# Patient Record
Sex: FEMALE | Race: WHITE | HISPANIC OR LATINO | Employment: UNEMPLOYED | URBAN - METROPOLITAN AREA
[De-identification: names, ages, dates, MRNs, and addresses within clinical notes are randomized per-mention and may not be internally consistent; named-entity substitution may affect disease eponyms.]

---

## 2019-04-05 ENCOUNTER — HOSPITAL ENCOUNTER (EMERGENCY)
Facility: HOSPITAL | Age: 24
Discharge: HOME | End: 2019-04-06
Attending: EMERGENCY MEDICINE

## 2019-04-05 DIAGNOSIS — N30.00 ACUTE CYSTITIS WITHOUT HEMATURIA: Primary | ICD-10-CM

## 2019-04-05 LAB
ALBUMIN SERPL BCP-MCNC: 4.6 G/DL (ref 3.5–5.2)
ALP SERPL-CCNC: 98 U/L (ref 55–135)
ALT SERPL W/O P-5'-P-CCNC: 13 U/L (ref 10–44)
ANION GAP SERPL CALC-SCNC: 7 MMOL/L (ref 8–16)
AST SERPL-CCNC: 17 U/L (ref 10–40)
B-HCG UR QL: NEGATIVE
BACTERIA #/AREA URNS HPF: NORMAL /HPF
BASOPHILS # BLD AUTO: 0.06 K/UL (ref 0–0.2)
BASOPHILS NFR BLD: 0.7 % (ref 0–1.9)
BILIRUB SERPL-MCNC: 0.3 MG/DL (ref 0.1–1)
BILIRUB UR QL STRIP: NEGATIVE
BUN SERPL-MCNC: 10 MG/DL (ref 6–20)
CALCIUM SERPL-MCNC: 9.7 MG/DL (ref 8.7–10.5)
CHLORIDE SERPL-SCNC: 104 MMOL/L (ref 95–110)
CLARITY UR: CLEAR
CO2 SERPL-SCNC: 27 MMOL/L (ref 23–29)
COLOR UR: YELLOW
CREAT SERPL-MCNC: 0.7 MG/DL (ref 0.5–1.4)
CTP QC/QA: YES
DIFFERENTIAL METHOD: ABNORMAL
EOSINOPHIL # BLD AUTO: 0.4 K/UL (ref 0–0.5)
EOSINOPHIL NFR BLD: 4.2 % (ref 0–8)
ERYTHROCYTE [DISTWIDTH] IN BLOOD BY AUTOMATED COUNT: 12.5 % (ref 11.5–14.5)
EST. GFR  (AFRICAN AMERICAN): >60 ML/MIN/1.73 M^2
EST. GFR  (NON AFRICAN AMERICAN): >60 ML/MIN/1.73 M^2
GLUCOSE SERPL-MCNC: 104 MG/DL (ref 70–110)
GLUCOSE UR QL STRIP: NEGATIVE
HCT VFR BLD AUTO: 38.5 % (ref 37–48.5)
HGB BLD-MCNC: 13.3 G/DL (ref 12–16)
HGB UR QL STRIP: NEGATIVE
KETONES UR QL STRIP: NEGATIVE
LEUKOCYTE ESTERASE UR QL STRIP: ABNORMAL
LIPASE SERPL-CCNC: 24 U/L (ref 4–60)
LYMPHOCYTES # BLD AUTO: 2.9 K/UL (ref 1–4.8)
LYMPHOCYTES NFR BLD: 35.4 % (ref 18–48)
MCH RBC QN AUTO: 31.4 PG (ref 27–31)
MCHC RBC AUTO-ENTMCNC: 34.5 G/DL (ref 32–36)
MCV RBC AUTO: 91 FL (ref 82–98)
MICROSCOPIC COMMENT: NORMAL
MONOCYTES # BLD AUTO: 0.6 K/UL (ref 0.3–1)
MONOCYTES NFR BLD: 6.9 % (ref 4–15)
NEUTROPHILS # BLD AUTO: 4.3 K/UL (ref 1.8–7.7)
NEUTROPHILS NFR BLD: 52.7 % (ref 38–73)
NITRITE UR QL STRIP: NEGATIVE
PH UR STRIP: 7 [PH] (ref 5–8)
PLATELET # BLD AUTO: 228 K/UL (ref 150–350)
PMV BLD AUTO: 10.4 FL (ref 9.2–12.9)
POTASSIUM SERPL-SCNC: 3.9 MMOL/L (ref 3.5–5.1)
PROT SERPL-MCNC: 8.1 G/DL (ref 6–8.4)
PROT UR QL STRIP: NEGATIVE
RBC # BLD AUTO: 4.24 M/UL (ref 4–5.4)
RBC #/AREA URNS HPF: 2 /HPF (ref 0–4)
SODIUM SERPL-SCNC: 138 MMOL/L (ref 136–145)
SP GR UR STRIP: 1.01 (ref 1–1.03)
SQUAMOUS #/AREA URNS HPF: 3 /HPF
URN SPEC COLLECT METH UR: ABNORMAL
UROBILINOGEN UR STRIP-ACNC: NEGATIVE EU/DL
WBC # BLD AUTO: 8.24 K/UL (ref 3.9–12.7)
WBC #/AREA URNS HPF: 5 /HPF (ref 0–5)

## 2019-04-05 PROCEDURE — 80053 COMPREHEN METABOLIC PANEL: CPT

## 2019-04-05 PROCEDURE — 81025 URINE PREGNANCY TEST: CPT | Performed by: EMERGENCY MEDICINE

## 2019-04-05 PROCEDURE — 63600175 PHARM REV CODE 636 W HCPCS: Performed by: EMERGENCY MEDICINE

## 2019-04-05 PROCEDURE — 96374 THER/PROPH/DIAG INJ IV PUSH: CPT

## 2019-04-05 PROCEDURE — 81000 URINALYSIS NONAUTO W/SCOPE: CPT

## 2019-04-05 PROCEDURE — 83690 ASSAY OF LIPASE: CPT

## 2019-04-05 PROCEDURE — 85025 COMPLETE CBC W/AUTO DIFF WBC: CPT

## 2019-04-05 PROCEDURE — 99285 EMERGENCY DEPT VISIT HI MDM: CPT | Mod: 25

## 2019-04-05 RX ORDER — ONDANSETRON 2 MG/ML
4 INJECTION INTRAMUSCULAR; INTRAVENOUS
Status: COMPLETED | OUTPATIENT
Start: 2019-04-05 | End: 2019-04-05

## 2019-04-05 RX ORDER — KETOROLAC TROMETHAMINE 30 MG/ML
15 INJECTION, SOLUTION INTRAMUSCULAR; INTRAVENOUS
Status: COMPLETED | OUTPATIENT
Start: 2019-04-05 | End: 2019-04-06

## 2019-04-05 RX ADMIN — IOHEXOL 100 ML: 350 INJECTION, SOLUTION INTRAVENOUS at 11:04

## 2019-04-05 RX ADMIN — ONDANSETRON 4 MG: 2 INJECTION INTRAMUSCULAR; INTRAVENOUS at 09:04

## 2019-04-06 VITALS
TEMPERATURE: 99 F | WEIGHT: 104 LBS | DIASTOLIC BLOOD PRESSURE: 61 MMHG | HEART RATE: 65 BPM | OXYGEN SATURATION: 100 % | SYSTOLIC BLOOD PRESSURE: 119 MMHG | RESPIRATION RATE: 16 BRPM

## 2019-04-06 PROCEDURE — 63600175 PHARM REV CODE 636 W HCPCS: Performed by: EMERGENCY MEDICINE

## 2019-04-06 PROCEDURE — 96375 TX/PRO/DX INJ NEW DRUG ADDON: CPT

## 2019-04-06 PROCEDURE — 25500020 PHARM REV CODE 255: Performed by: EMERGENCY MEDICINE

## 2019-04-06 RX ORDER — CEPHALEXIN 500 MG/1
500 CAPSULE ORAL EVERY 8 HOURS
Qty: 21 CAPSULE | Refills: 0 | Status: SHIPPED | OUTPATIENT
Start: 2019-04-06 | End: 2019-04-13

## 2019-04-06 RX ADMIN — KETOROLAC TROMETHAMINE 15 MG: 30 INJECTION, SOLUTION INTRAMUSCULAR at 12:04

## 2019-04-06 RX ADMIN — CEFTRIAXONE 1 G: 1 INJECTION, SOLUTION INTRAVENOUS at 01:04

## 2019-04-06 NOTE — ED TRIAGE NOTES
Patient presents to the ED with complaints of bilateral pelvic pain that has been intermittent for a couple of weeks. Tender to touch.  Patient reports one episode of emesis today with nausea. Denies pain with urination. Reports small amount of blood in urine. Denies past history of kidney stones or UTIs. Patient has not taken anything for pain. Pain rated 10/10.     Review of patient's allergies indicates:  No Known Allergies     Patient has verified the spelling of their name and  on armband.   APPEARANCE: Patient is alert, calm, oriented x 4, and does not appear distressed.  SKIN: Skin is normal for race, warm, and dry. Normal skin turgor and mucous membranes moist.  CARDIAC: Normal rate and rhythm, no murmur heard.   RESPIRATORY:Normal rate and effort. Breath sounds clear bilaterally throughout chest. Respirations are equal and unlabored.    GASTRO: Bowel sounds normal, abdomen is soft, no tenderness, and no abdominal distention. +n/V  GENITOURINARY: +bilateral pelvic pain. Tenderness to touch. Pain is 10/10 +small amount  Of discharge

## 2019-04-06 NOTE — ED PROVIDER NOTES
Encounter Date: 4/5/2019    SCRIBE #1 NOTE: I, Michelle Ahumada, am scribing for, and in the presence of,  Dr. Jones. I have scribed the entire note.     I, Dr. Juana Jones MD, personally performed the services described in this documentation. All medical record entries made by the scribe were at my direction and in my presence.  I have reviewed the chart and agree that the record reflects my personal performance and is accurate and complete. Juana Jones MD.    History     Chief Complaint   Patient presents with    Abdominal Pain     PT C/O BILATERAL LOWER ABDOMINAL PAIN X2 WEEKS WITH INTERMITTENT NAUSEA. DENIES V/D.     CHIEF COMPLAINT: Patient presents with: Abdominal Pain      HISTORY OF PRESENT ILLNESS: Jessie Sanchez who is a 23 y.o. presents to the emergency department today with complaint of intermittent abdominal pain for the past 2 weeks but is worse today. Patient feels the pain equally on both sides of her abdomen. She reports vaginal discharge x 1 day and slight vaginal bleeding a few days ago. She reports to have nausea and vomiting today but does not report how many episodes of vomiting she had.  She says to have eaten normally today but only a little bit yesterday due to her pain. Patient denies any fever, diarrhea, or any other concerning symptoms. Patient's last known menstrual cycle was 03/28/19.      ALLERGIES REVIEWED  MEDICATIONS REVIEWED  PMH/PSH/SOC/FH REVIEWED     The history is provided by the patient.    Nursing/Ancillary staff note reviewed.      The history is provided by the patient. The history is limited by a language barrier. A  was used.     Review of patient's allergies indicates:  No Known Allergies  History reviewed. No pertinent past medical history.  History reviewed. No pertinent surgical history.  No family history on file.  Social History     Tobacco Use    Smoking status: Not on file   Substance Use Topics    Alcohol use: Not on file     Drug use: Not on file     Review of Systems   Constitutional: Negative for activity change, appetite change, chills, diaphoresis and fever.   HENT: Negative for congestion, drooling, ear pain, mouth sores, rhinorrhea, sinus pain, sore throat and trouble swallowing.    Eyes: Negative for pain and discharge.   Respiratory: Negative for cough, chest tightness, shortness of breath, wheezing and stridor.    Cardiovascular: Negative for chest pain, palpitations and leg swelling.   Gastrointestinal: Positive for abdominal pain, nausea and vomiting. Negative for abdominal distention, blood in stool, constipation and diarrhea.   Genitourinary: Positive for vaginal bleeding and vaginal discharge. Negative for difficulty urinating, dysuria, flank pain, frequency, hematuria and urgency.   Musculoskeletal: Negative for arthralgias, back pain and myalgias.   Skin: Negative for pallor, rash and wound.   Neurological: Negative for dizziness, syncope, weakness, light-headedness and numbness.   All other systems reviewed and are negative.      Physical Exam     Initial Vitals [04/05/19 2025]   BP Pulse Resp Temp SpO2   (!) 146/80 107 18 98.5 °F (36.9 °C) 100 %      MAP       --         Physical Exam    Nursing note and vitals reviewed.  Constitutional: She appears well-developed and well-nourished.   HENT:   Head: Normocephalic and atraumatic.   Right Ear: External ear normal.   Left Ear: External ear normal.   Nose: Nose normal.   Mouth/Throat: Oropharynx is clear and moist.   Eyes: Conjunctivae and EOM are normal. Pupils are equal, round, and reactive to light. No scleral icterus.   Neck: Normal range of motion. Neck supple. No JVD present.   Cardiovascular: Normal rate, regular rhythm, normal heart sounds and intact distal pulses. Exam reveals no gallop and no friction rub.    No murmur heard.  Pulmonary/Chest: Breath sounds normal. No stridor. No respiratory distress. She has no wheezes. She exhibits no tenderness.    Abdominal: Soft. Bowel sounds are normal. She exhibits no distension and no mass. There is tenderness. There is no rebound and no guarding.   Tenderness along lower abdomen   Musculoskeletal: Normal range of motion. She exhibits no edema or tenderness.   Back is nontender to palpation.    Neurological: She is alert and oriented to person, place, and time. She has normal strength. No cranial nerve deficit.   Skin: Skin is warm and dry. Capillary refill takes less than 2 seconds. No rash noted. No pallor.   Psychiatric: She has a normal mood and affect. Thought content normal.         ED Course   Procedures  Labs Reviewed   CBC W/ AUTO DIFFERENTIAL - Abnormal; Notable for the following components:       Result Value    MCH 31.4 (*)     All other components within normal limits   COMPREHENSIVE METABOLIC PANEL - Abnormal; Notable for the following components:    Anion Gap 7 (*)     All other components within normal limits   URINALYSIS, REFLEX TO URINE CULTURE - Abnormal; Notable for the following components:    Leukocytes, UA 2+ (*)     All other components within normal limits    Narrative:     Preferred Collection Type->Urine, Clean Catch   LIPASE   URINALYSIS MICROSCOPIC    Narrative:     Preferred Collection Type->Urine, Clean Catch   POCT URINE PREGNANCY          X-Rays:   Independently Interpreted Readings:   Other Readings:  Reviewed by myself, read by radiology.    Imaging Results          CT Abdomen Pelvis With Contrast (Final result)  Result time 04/06/19 00:50:09    Final result by Seferino Nunn MD (04/06/19 00:50:09)                 Impression:      1.  No CT evidence of acute intra-abdominal abnormality, noting unremarkable appearance of the appendix.    2.  Small corpus luteum cyst within the right adnexa.      Electronically signed by: Seferino Nunn MD  Date:    04/06/2019  Time:    00:50             Narrative:    EXAMINATION:  CT ABDOMEN PELVIS WITH CONTRAST    CLINICAL HISTORY:  RLQ pain,  appendicitis suspected;    TECHNIQUE:  Low dose axial images, sagittal and coronal reformations were obtained from the lung bases to the pubic symphysis following the IV administration of 75 mL of Omnipaque 350 .  Oral contrast was not given.    COMPARISON:  None.    FINDINGS:  The lung bases are unremarkable.  There is no pleural fluid present.  The visualized portions of the heart appear normal.    The liver is normal in size and attenuation with no focal hepatic abnormality.  The gallbladder shows no evidence of stones or pericholecystic fluid.  There is no intra-or extrahepatic biliary ductal dilatation.    The stomach, spleen, pancreas, and adrenal glands are unremarkable.    The kidneys enhance symmetrically and demonstrate no evidence of hydronephrosis.  The urinary bladder is unremarkable. The uterus appears within normal limits.  There is a probable small 1.5 cm corpus luteum cyst within the right adnexa.  There is no significant free fluid within the pelvis.    The abdominal aorta is normal in course and caliber without significant atherosclerotic calcifications.    The visualized loops of small and large bowel show no evidence of obstruction or inflammation.  The appendix appears within normal limits.  There is no ascites or free intraperitoneal air.    When viewed with bone windows the osseous structures are unremarkable.  The extraperitoneal soft tissues are unremarkable.                              Medical Decision Making:   Initial Assessment:   Jessie Sanchez presents to the emergency department today complaining of abdominal pain.  Will go ahead and obtain labs and given the extent of pain obtain imaging.  Treat symptoms and reassess.  Differential Diagnosis:   Gastroenteritis, gastritis, ulcer, cholecystitis, gallstones, pancreatitis, ileus, small bowel obstruction, appendicitis, constipation.   Clinical Tests:   Lab Tests: Ordered and Reviewed  Radiological Study: Ordered and  Reviewed  ED Management:  The pt presents to the ED today with lower abdominal pain, her workup today shows UA. I did get a CT scan in order to further evaluate her pain, no need for admission. I will discharge her home on ABX. Her pain is improved following meds here and she feels improved. After taking into careful account the historical factors and physical exam findings of the patient's presentation today, in conjunction with the empirical and objective data obtained on ED workup, no acute emergent medical condition requiring admission has been identified. The patient appears to be low risk for an emergent medical condition and I feel it is safe and appropriate at this time for the patient to be discharged to follow-up as detailed in their discharge instructions for reevaluation and possible continued outpatient workup and management. I have discussed the specifics of the workup with the patient and the patient has verbalized understanding of the details of the workup, the diagnosis, the treatment plan, and the need for outpatient follow-up.  Although the patient has no emergent etiology today this does not preclude the development of an emergent condition so in addition, I have advised the patient that they can return to the ED and/or activate EMS at any time with worsening of their symptoms, change of their symptoms, or with any other medical complaint.  The patient remained comfortable and stable during their visit in the ED.  Discharge and follow-up instructions discussed with the patient who expressed understanding and willingness to comply with my recommendations.                      Clinical Impression:     1. Acute cystitis without hematuria        Disposition:   Disposition: Discharged  Condition: Stable                        Juana Jones MD  04/09/19 0208

## 2019-04-06 NOTE — DISCHARGE INSTRUCTIONS
Additional instructions  Followup with your primary care physician in 5-7 days if you are not improving. Take all your medications as prescribed. Return to the emergency department if you have increasing pain, chest pain, difficulty breathing,  nonstop vomiting, or any other concerns. Be sure to drink plenty of fluids to stay hydrated. Get plenty of rest. Please refer to additional educational material for further instructions.

## 2019-07-12 ENCOUNTER — TELEPHONE (OUTPATIENT)
Dept: OBSTETRICS AND GYNECOLOGY | Facility: CLINIC | Age: 24
End: 2019-07-12

## 2019-07-12 NOTE — TELEPHONE ENCOUNTER
----- Message from Boogie Menchaca sent at 7/11/2019  3:37 PM CDT -----  Contact: Referral  NP to dept positive pregnancy test referred by Etienne Uribe NP referral scanned into  pt primary language is Mongolian will need an  [phone]) pt can be reached a [phone]

## 2019-07-12 NOTE — TELEPHONE ENCOUNTER
Spoke to patient and she wants to schedule in [place]. Gave her the phone number [phone]. Patient says she has medicaid and it has been approved. Let her know to have this information ready when she calls the number to schedule her appointment. Patient verbalized understanding.

## 2019-07-24 ENCOUNTER — TELEPHONE (OUTPATIENT)
Dept: OBSTETRICS AND GYNECOLOGY | Facility: CLINIC | Age: 24
End: 2019-07-24

## 2019-07-24 NOTE — TELEPHONE ENCOUNTER
Returned call via language line, pt scheduled for 7/30 at 1pm. Provided address/nidhi, Patient verbalized understanding.

## 2019-07-24 NOTE — TELEPHONE ENCOUNTER
----- Message from Boogie Menchaca sent at 7/24/2019 12:55 PM CDT -----  Contact: referral   NP to dept positive pregnancy test referred by Etienne Uribe NP referral scanned into  pt primary language is South African will need an  [phone]) pt can be reached a [phone]

## 2019-07-30 ENCOUNTER — OFFICE VISIT (OUTPATIENT)
Dept: OBSTETRICS AND GYNECOLOGY | Facility: CLINIC | Age: 24
End: 2019-07-30
Payer: MEDICAID

## 2019-07-30 ENCOUNTER — TELEPHONE (OUTPATIENT)
Dept: OBSTETRICS AND GYNECOLOGY | Facility: CLINIC | Age: 24
End: 2019-07-30

## 2019-07-30 VITALS
SYSTOLIC BLOOD PRESSURE: 108 MMHG | WEIGHT: 103.19 LBS | BODY MASS INDEX: 20.8 KG/M2 | DIASTOLIC BLOOD PRESSURE: 64 MMHG | HEIGHT: 59 IN

## 2019-07-30 DIAGNOSIS — N91.2 ABSENT MENSES: ICD-10-CM

## 2019-07-30 DIAGNOSIS — Z32.01 POSITIVE URINE PREGNANCY TEST: Primary | ICD-10-CM

## 2019-07-30 LAB
B-HCG UR QL: POSITIVE
CTP QC/QA: YES

## 2019-07-30 PROCEDURE — 87481 CANDIDA DNA AMP PROBE: CPT | Mod: 59

## 2019-07-30 PROCEDURE — 88141 CYTOPATH C/V INTERPRET: CPT | Mod: ,,, | Performed by: PATHOLOGY

## 2019-07-30 PROCEDURE — 99999 PR PBB SHADOW E&M-EST. PATIENT-LVL III: CPT | Mod: PBBFAC,,, | Performed by: OBSTETRICS & GYNECOLOGY

## 2019-07-30 PROCEDURE — 88175 CYTOPATH C/V AUTO FLUID REDO: CPT | Performed by: PATHOLOGY

## 2019-07-30 PROCEDURE — 87491 CHLMYD TRACH DNA AMP PROBE: CPT

## 2019-07-30 PROCEDURE — 99203 OFFICE O/P NEW LOW 30 MIN: CPT | Mod: TH,S$PBB,, | Performed by: OBSTETRICS & GYNECOLOGY

## 2019-07-30 PROCEDURE — 81025 URINE PREGNANCY TEST: CPT | Mod: PBBFAC,PO | Performed by: OBSTETRICS & GYNECOLOGY

## 2019-07-30 PROCEDURE — 87086 URINE CULTURE/COLONY COUNT: CPT

## 2019-07-30 PROCEDURE — 99999 PR PBB SHADOW E&M-EST. PATIENT-LVL III: ICD-10-PCS | Mod: PBBFAC,,, | Performed by: OBSTETRICS & GYNECOLOGY

## 2019-07-30 PROCEDURE — 88141 LIQUID-BASED PAP SMEAR, SCREENING: ICD-10-PCS | Mod: ,,, | Performed by: PATHOLOGY

## 2019-07-30 PROCEDURE — 99213 OFFICE O/P EST LOW 20 MIN: CPT | Mod: PBBFAC,TH,PO | Performed by: OBSTETRICS & GYNECOLOGY

## 2019-07-30 PROCEDURE — 87801 DETECT AGNT MULT DNA AMPLI: CPT

## 2019-07-30 PROCEDURE — 99203 PR OFFICE/OUTPT VISIT, NEW, LEVL III, 30-44 MIN: ICD-10-PCS | Mod: TH,S$PBB,, | Performed by: OBSTETRICS & GYNECOLOGY

## 2019-07-30 RX ORDER — PNV NO.95/FERROUS FUM/FOLIC AC 28MG-0.8MG
TABLET ORAL
Refills: 4 | COMMUNITY
Start: 2019-07-13

## 2019-07-30 RX ORDER — ONDANSETRON 4 MG/1
4 TABLET, ORALLY DISINTEGRATING ORAL EVERY 6 HOURS PRN
Qty: 30 TABLET | Refills: 1 | Status: SHIPPED | OUTPATIENT
Start: 2019-07-30 | End: 2019-09-03

## 2019-07-30 NOTE — TELEPHONE ENCOUNTER
Contacted pt via Language line to schedule anatomy sooner than 8/15, opening became available for 8/8 pt rescheduled for 8/8 at 2pm, and to do labs after. Patient verbalized understanding.

## 2019-07-30 NOTE — PROGRESS NOTES
"                                                                                     OBSTETRICS OFFICE NOTE  Reason for visit: Absence of menses    HPI: Pt is a 24 y.o.  female  who presents with complaint of absence of menstruation.  She reports nausea with occasional vomiting. Denies abdominal pain/bleeding.  UPT is positive. LMP:unsure sometime in May . Reports previously regular cycles. Last pap never.    Past Medical History:   Diagnosis Date    Anemia        History reviewed. No pertinent surgical history.    Family History   Problem Relation Age of Onset    Diabetes Father     Hypertension Mother     Breast cancer Neg Hx     Colon cancer Neg Hx     Ovarian cancer Neg Hx        Social History     Tobacco Use    Smoking status: Never Smoker    Smokeless tobacco: Never Used   Substance Use Topics    Alcohol use: Not Currently    Drug use: Never       OB History    Para Term  AB Living   0 0 0 0 0 0   SAB TAB Ectopic Multiple Live Births   0 0 0 0 0       Current Outpatient Medications   Medication Sig    PRENATAL 28 mg iron- 800 mcg Tab TK 1 T PO QD    ondansetron (ZOFRAN-ODT) 4 MG TbDL dissolve 1 tablet (4 mg total) by mouth every 6 (six) hours as needed.     No current facility-administered medications for this visit.        Allergies: Patient has no known allergies.     /64   Ht 4' 11" (1.499 m)   Wt 46.8 kg (103 lb 2.8 oz)   LMP 2019 (Within Weeks)   BMI 20.84 kg/m²     ROS:  GENERAL:Denies fever or chills.   SKIN: Denies rash or lesions.   HEAD: Denies head injury or headache.   CHEST: Denies chest pain or shortness of breath.   CARDIOVASCULAR: Denies palpitations or chest pain.   ABDOMEN: See HPI  URINARY: see HPI.  REPRODUCTIVE: See HPI.   BREASTS: Denies pain, lumps, or nipple discharge.   HEMATOLOGIC: No easy bruisability or excessive bleeding.   MUSCULOSKELETAL: Denies joint pain or swelling.   NEUROLOGIC: Denies syncope or weakness.     Physical " Exam:  GENERAL: alert, appears stated age and cooperative  NEUROLOGIC: orientated to person, place and time, normal mood and affect   CHEST: Normal respiratory effort  NECK: normal appearance  SKIN: no acne, striae, hirsutism  BREAST EXAM: breasts appear normal, no suspicious masses, no skin or nipple changes or axillary nodes  ABDOMEN: abdomen is soft without significant tenderness, masses, organomegaly or guarding, no hernias noted  EXTERNAL GENITALIA:  normal general appearance  URETHRA: normal urethra, normal urethral meatus  VAGINA:  Normal mucosa without tenderness, induration or masses  CERVIX:  Normal  UTERUS:  size consistent with 20 weeks  ADNEXA: nontender normal adnexa in size, nontender and no masses    PROCEDURES:  - UPT: positive  - Dating U/S: to be scheduled     ASSESSMENT and PLAN:    ICD-10-CM ICD-9-CM    1. Positive urine pregnancy test Z32.01 V72.42 Urine culture      POCT urine pregnancy      Type & Screen - Ob Profile      CBC auto differential      RPR      HIV 1/2 Ag/Ab (4th Gen)      Hepatitis B surface antigen      Rubella antibody, IgG      C. trachomatis/N. gonorrhoeae by AMP DNA      Vaginosis Screen by DNA Probe      Liquid-based pap smear, screening      Hemoglobin Electrophoresis,Hgb A2 Aj.      Cystic Fibrosis Mutation Panel      US OB/GYN Procedure (Viewpoint)      CANCELED: US OB/GYN Procedure (Viewpoint)      CANCELED: US OB Less Than 14 Wks First Gestation   2. Absent menses N91.2 626.0        Plan:   1. +UPT, PNL and dating U/S ordered. Bedside U/S confirms around 20weeks today, female, anterior placenta, +FCA at 142bpm    Patient was counseled today on routine 2T precautions, including vaginal bleeding and abdominal pain. Anueploidy screening offered.  Weight: We discussed proper weight gain based on the [employer]'s recommendations based on her pre-pregnancy weight- BMI: 18.5-24.9= 25-35 lbs total and 1 lb/week in 2nd-3rd trimester. Diet: Avoid raw meat ie sushi,  unpasteurized cheese, and heat up deli meat. Eat fish that are high in mercury (nacho mackerel, swordfish, tuna) only 6-12 oz once  a week. Environment: Patient also given environmental precautions such as avoiding cat litter and gardening without gloves. Discussed daily prenatal vitamin with folate/iron options (i.e. stool softener, DHA) and avoidance of smoking. Regular and moderate exercise for 30 min or more per day with the avoidance of activities with a high risk of falling, prolonged supine positions, or abdominal trauma.    Follow up in about 4 weeks (around 8/27/2019) for prenatal visit.    Jessica Morejon MD  OB/GYN  Pager: [phone]

## 2019-07-30 NOTE — LETTER
July 30, 2019      Etienne Uribe, [place]  [address]  [address]  [facility] [address]           [facility]  [address] [facility] [address]  [place] [address]  Phone: [phone]          Patient: Jessie Ayers   MR Number: [id number]   YOB: 1995   Date of Visit: 7/30/2019       Dear Etienne Uribe:    Thank you for referring Jessie Ayers to me for evaluation. Attached you will find relevant portions of my assessment and plan of care.    If you have questions, please do not hesitate to call me. I look forward to following Jessie Ayesr along with you.    Sincerely,    Jessica Morejon MD    Enclosure  CC:  No Recipients    If you would like to receive this communication electronically, please contact [email or web address] or [phone] to request more information on [email or web address] Link access.    For providers and/or their staff who would like to refer a patient to [facility], please contact us through our one-stop-shop provider referral line, [facility], at [phone].    If you feel you have received this communication in error or would no longer like to receive these types of communications, please e-mail [email or web address]

## 2019-08-01 LAB
BACTERIA UR CULT: NO GROWTH
BACTERIAL VAGINOSIS DNA: POSITIVE
C TRACH DNA SPEC QL NAA+PROBE: DETECTED
CANDIDA GLABRATA DNA: NEGATIVE
CANDIDA KRUSEI DNA: NEGATIVE
CANDIDA RRNA VAG QL PROBE: POSITIVE
N GONORRHOEA DNA SPEC QL NAA+PROBE: NOT DETECTED
T VAGINALIS RRNA GENITAL QL PROBE: NEGATIVE

## 2019-08-02 ENCOUNTER — TELEPHONE (OUTPATIENT)
Dept: OBSTETRICS AND GYNECOLOGY | Facility: CLINIC | Age: 24
End: 2019-08-02

## 2019-08-02 RX ORDER — FLUCONAZOLE 150 MG/1
150 TABLET ORAL ONCE
Qty: 2 TABLET | Refills: 0 | Status: SHIPPED | OUTPATIENT
Start: 2019-08-02 | End: 2019-08-06

## 2019-08-02 RX ORDER — AZITHROMYCIN 500 MG/1
1000 TABLET, FILM COATED ORAL ONCE
Qty: 2 TABLET | Refills: 1 | Status: SHIPPED | OUTPATIENT
Start: 2019-08-02 | End: 2019-08-09

## 2019-08-02 RX ORDER — METRONIDAZOLE 500 MG/1
500 TABLET ORAL EVERY 12 HOURS
Qty: 14 TABLET | Refills: 0 | Status: SHIPPED | OUTPATIENT
Start: 2019-08-02 | End: 2019-08-12

## 2019-08-02 NOTE — TELEPHONE ENCOUNTER
Contacted pt via Language line, informed pt of +BV, + Yeast, and + Chlamydia. Informed pt Chlamydia is a sexually transmitted infection, acquired by having sexual intercourse with a partner who also has the infection. Informed pt I can be treated with a medication called Azithromycin, 2 tabs taken at the same time once. Advised pt to pickup refill to give to partner to take as well and avoid sexual contact for 7 days so infection can clear. Patient verbalized understanding. Explained to pt BV is Bacterial Vaginosis which is an overgrowth of bacteria in the vagina, not sexually transmitted, and can be treated with Flagyl taken twice a day for 7 days. Patient verbalized understanding. Also, informed of Diflucan sent to treat the yeast infection. Advised pt to pickup all 3 rx and take as directed.

## 2019-08-02 NOTE — TELEPHONE ENCOUNTER
----- Message from Jessica Morejon MD sent at 8/2/2019  1:12 PM CDT -----  Please inform of yeast, bv and chlamydia. Rx sent

## 2019-08-06 ENCOUNTER — TELEPHONE (OUTPATIENT)
Dept: OBSTETRICS AND GYNECOLOGY | Facility: CLINIC | Age: 24
End: 2019-08-06

## 2019-08-06 NOTE — TELEPHONE ENCOUNTER
----- Message from Ada Leslie sent at 8/6/2019  9:51 AM CDT -----  Contact: Self [phone]  Patient is calling to see if the medications she was prescribed she has to share with her  or medication will be called for him for the infection. Speaks Amharic only

## 2019-08-06 NOTE — TELEPHONE ENCOUNTER
Returned call via Language Line, informed pt Dr. Morejon sent a refill of the Azithromycin to give to partner to treat the Chlamydia. Pt states she thought they had to pay for an additional rx. Informed pt Azithromycin cannot be purchased without a prescription. Advised pt to go back to pharmacy and pickup refill for partner. Patient verbalized understanding.

## 2019-08-08 ENCOUNTER — LAB VISIT (OUTPATIENT)
Dept: LAB | Facility: HOSPITAL | Age: 24
End: 2019-08-08
Attending: OBSTETRICS & GYNECOLOGY
Payer: MEDICAID

## 2019-08-08 ENCOUNTER — PROCEDURE VISIT (OUTPATIENT)
Dept: OBSTETRICS AND GYNECOLOGY | Facility: CLINIC | Age: 24
End: 2019-08-08
Payer: MEDICAID

## 2019-08-08 DIAGNOSIS — Z32.01 POSITIVE URINE PREGNANCY TEST: ICD-10-CM

## 2019-08-08 LAB
ABO + RH BLD: NORMAL
BASOPHILS # BLD AUTO: 0.02 K/UL (ref 0–0.2)
BASOPHILS NFR BLD: 0.2 % (ref 0–1.9)
BLD GP AB SCN CELLS X3 SERPL QL: NORMAL
DIFFERENTIAL METHOD: ABNORMAL
EOSINOPHIL # BLD AUTO: 0.1 K/UL (ref 0–0.5)
EOSINOPHIL NFR BLD: 1.3 % (ref 0–8)
ERYTHROCYTE [DISTWIDTH] IN BLOOD BY AUTOMATED COUNT: 13.5 % (ref 11.5–14.5)
HCT VFR BLD AUTO: 34.2 % (ref 37–48.5)
HGB BLD-MCNC: 11.8 G/DL (ref 12–16)
LYMPHOCYTES # BLD AUTO: 1.6 K/UL (ref 1–4.8)
LYMPHOCYTES NFR BLD: 18.9 % (ref 18–48)
MCH RBC QN AUTO: 32.5 PG (ref 27–31)
MCHC RBC AUTO-ENTMCNC: 34.5 G/DL (ref 32–36)
MCV RBC AUTO: 94 FL (ref 82–98)
MONOCYTES # BLD AUTO: 0.6 K/UL (ref 0.3–1)
MONOCYTES NFR BLD: 7.2 % (ref 4–15)
NEUTROPHILS # BLD AUTO: 6.1 K/UL (ref 1.8–7.7)
NEUTROPHILS NFR BLD: 72.4 % (ref 38–73)
PLATELET # BLD AUTO: 191 K/UL (ref 150–350)
PMV BLD AUTO: 10.5 FL (ref 9.2–12.9)
RBC # BLD AUTO: 3.63 M/UL (ref 4–5.4)
WBC # BLD AUTO: 8.48 K/UL (ref 3.9–12.7)

## 2019-08-08 PROCEDURE — 85025 COMPLETE CBC W/AUTO DIFF WBC: CPT

## 2019-08-08 PROCEDURE — 83020 HEMOGLOBIN ELECTROPHORESIS: CPT

## 2019-08-08 PROCEDURE — 86850 RBC ANTIBODY SCREEN: CPT

## 2019-08-08 PROCEDURE — 87340 HEPATITIS B SURFACE AG IA: CPT

## 2019-08-08 PROCEDURE — 86703 HIV-1/HIV-2 1 RESULT ANTBDY: CPT

## 2019-08-08 PROCEDURE — 86592 SYPHILIS TEST NON-TREP QUAL: CPT

## 2019-08-08 PROCEDURE — 81220 CFTR GENE COM VARIANTS: CPT

## 2019-08-08 PROCEDURE — 99499 NO LOS: ICD-10-PCS | Mod: S$PBB,,, | Performed by: OBSTETRICS & GYNECOLOGY

## 2019-08-08 PROCEDURE — 86762 RUBELLA ANTIBODY: CPT

## 2019-08-08 PROCEDURE — 99499 UNLISTED E&M SERVICE: CPT | Mod: S$PBB,,, | Performed by: OBSTETRICS & GYNECOLOGY

## 2019-08-08 PROCEDURE — 36415 COLL VENOUS BLD VENIPUNCTURE: CPT

## 2019-08-09 LAB
HBV SURFACE AG SERPL QL IA: NEGATIVE
HGB A2 MFR BLD HPLC: 3.1 % (ref 2.2–3.2)
HGB FRACT BLD ELPH-IMP: NORMAL
HGB FRACT BLD ELPH-IMP: NORMAL
HIV 1+2 AB+HIV1 P24 AG SERPL QL IA: NEGATIVE
RPR SER QL: NORMAL
RUBV IGG SER-ACNC: 22.5 IU/ML
RUBV IGG SER-IMP: REACTIVE

## 2019-08-12 LAB — CFTR MUT ANL BLD/T: NORMAL

## 2019-09-03 ENCOUNTER — ROUTINE PRENATAL (OUTPATIENT)
Dept: OBSTETRICS AND GYNECOLOGY | Facility: CLINIC | Age: 24
End: 2019-09-03
Payer: MEDICAID

## 2019-09-03 ENCOUNTER — TELEPHONE (OUTPATIENT)
Dept: OBSTETRICS AND GYNECOLOGY | Facility: CLINIC | Age: 24
End: 2019-09-03

## 2019-09-03 ENCOUNTER — LAB VISIT (OUTPATIENT)
Dept: LAB | Facility: HOSPITAL | Age: 24
End: 2019-09-03
Attending: OBSTETRICS & GYNECOLOGY
Payer: MEDICAID

## 2019-09-03 VITALS
WEIGHT: 107.56 LBS | SYSTOLIC BLOOD PRESSURE: 100 MMHG | BODY MASS INDEX: 21.73 KG/M2 | DIASTOLIC BLOOD PRESSURE: 62 MMHG

## 2019-09-03 DIAGNOSIS — Z11.3 SCREENING FOR STD (SEXUALLY TRANSMITTED DISEASE): ICD-10-CM

## 2019-09-03 DIAGNOSIS — Z12.4 SCREENING FOR CERVICAL CANCER: ICD-10-CM

## 2019-09-03 DIAGNOSIS — Z34.02 SUPERVISION OF NORMAL FIRST PREGNANCY IN SECOND TRIMESTER: ICD-10-CM

## 2019-09-03 DIAGNOSIS — R87.615 UNSATISFACTORY CERVICAL PAPANICOLAOU SMEAR: Primary | ICD-10-CM

## 2019-09-03 DIAGNOSIS — N89.8 VAGINAL DISCHARGE: ICD-10-CM

## 2019-09-03 LAB
BASOPHILS # BLD AUTO: 0.01 K/UL (ref 0–0.2)
BASOPHILS NFR BLD: 0.1 % (ref 0–1.9)
DIFFERENTIAL METHOD: ABNORMAL
EOSINOPHIL # BLD AUTO: 0.1 K/UL (ref 0–0.5)
EOSINOPHIL NFR BLD: 1.6 % (ref 0–8)
ERYTHROCYTE [DISTWIDTH] IN BLOOD BY AUTOMATED COUNT: 13.3 % (ref 11.5–14.5)
GLUCOSE SERPL-MCNC: 177 MG/DL (ref 70–140)
HCT VFR BLD AUTO: 34.9 % (ref 37–48.5)
HGB BLD-MCNC: 11.7 G/DL (ref 12–16)
LYMPHOCYTES # BLD AUTO: 1.3 K/UL (ref 1–4.8)
LYMPHOCYTES NFR BLD: 15.1 % (ref 18–48)
MCH RBC QN AUTO: 32.4 PG (ref 27–31)
MCHC RBC AUTO-ENTMCNC: 33.5 G/DL (ref 32–36)
MCV RBC AUTO: 97 FL (ref 82–98)
MONOCYTES # BLD AUTO: 0.7 K/UL (ref 0.3–1)
MONOCYTES NFR BLD: 8.5 % (ref 4–15)
NEUTROPHILS # BLD AUTO: 6.2 K/UL (ref 1.8–7.7)
NEUTROPHILS NFR BLD: 74.7 % (ref 38–73)
PLATELET # BLD AUTO: 192 K/UL (ref 150–350)
PMV BLD AUTO: 10.3 FL (ref 9.2–12.9)
RBC # BLD AUTO: 3.61 M/UL (ref 4–5.4)
WBC # BLD AUTO: 8.5 K/UL (ref 3.9–12.7)

## 2019-09-03 PROCEDURE — 88141 CYTOPATH C/V INTERPRET: CPT | Mod: ,,, | Performed by: PATHOLOGY

## 2019-09-03 PROCEDURE — 99212 OFFICE O/P EST SF 10 MIN: CPT | Mod: PBBFAC,TH,PO | Performed by: OBSTETRICS & GYNECOLOGY

## 2019-09-03 PROCEDURE — 36415 COLL VENOUS BLD VENIPUNCTURE: CPT

## 2019-09-03 PROCEDURE — 99999 PR PBB SHADOW E&M-EST. PATIENT-LVL II: CPT | Mod: PBBFAC,,, | Performed by: OBSTETRICS & GYNECOLOGY

## 2019-09-03 PROCEDURE — 87481 CANDIDA DNA AMP PROBE: CPT | Mod: 59

## 2019-09-03 PROCEDURE — 87801 DETECT AGNT MULT DNA AMPLI: CPT

## 2019-09-03 PROCEDURE — 99999 PR PBB SHADOW E&M-EST. PATIENT-LVL II: ICD-10-PCS | Mod: PBBFAC,,, | Performed by: OBSTETRICS & GYNECOLOGY

## 2019-09-03 PROCEDURE — 99213 PR OFFICE/OUTPT VISIT, EST, LEVL III, 20-29 MIN: ICD-10-PCS | Mod: TH,S$PBB,, | Performed by: OBSTETRICS & GYNECOLOGY

## 2019-09-03 PROCEDURE — 88141 LIQUID-BASED PAP SMEAR, SCREENING: ICD-10-PCS | Mod: ,,, | Performed by: PATHOLOGY

## 2019-09-03 PROCEDURE — 87491 CHLMYD TRACH DNA AMP PROBE: CPT

## 2019-09-03 PROCEDURE — 85025 COMPLETE CBC W/AUTO DIFF WBC: CPT

## 2019-09-03 PROCEDURE — 82950 GLUCOSE TEST: CPT

## 2019-09-03 PROCEDURE — 87661 TRICHOMONAS VAGINALIS AMPLIF: CPT

## 2019-09-03 PROCEDURE — 88175 CYTOPATH C/V AUTO FLUID REDO: CPT | Performed by: PATHOLOGY

## 2019-09-03 PROCEDURE — 99213 OFFICE O/P EST LOW 20 MIN: CPT | Mod: TH,S$PBB,, | Performed by: OBSTETRICS & GYNECOLOGY

## 2019-09-03 NOTE — TELEPHONE ENCOUNTER
Returned call via Language Line, pt states she would like like to reschedule her appt on 9/17 to today or tomorrow. Pt states she doesn't have anything going on she just was suppose to be seen by 8/27 but missed the appointment and was told to call to see if she can be seen sooner than next available. Pt rescheduled for 9/3 at 10:30am. Patient verbalized understanding.

## 2019-09-03 NOTE — PROGRESS NOTES
Good fm.  Denies ctx, vb, lof. Retest GC/Ct and pap. RTC in 4 weeks. Ob glucose screen and cbc ordered. Consents signed for delivery today with

## 2019-09-03 NOTE — TELEPHONE ENCOUNTER
----- Message from Neena Diamond sent at 9/3/2019  8:29 AM CDT -----  Contact: [phone]/self  Patient requesting to speak with you regarding rescheduling her appointment on 9/17/19 for today. Please advise.

## 2019-09-04 ENCOUNTER — TELEPHONE (OUTPATIENT)
Dept: OBSTETRICS AND GYNECOLOGY | Facility: CLINIC | Age: 24
End: 2019-09-04

## 2019-09-04 DIAGNOSIS — O99.810 ABNORMAL GLUCOSE TOLERANCE AFFECTING PREGNANCY, ANTEPARTUM: Primary | ICD-10-CM

## 2019-09-04 LAB
C TRACH DNA SPEC QL NAA+PROBE: NOT DETECTED
N GONORRHOEA DNA SPEC QL NAA+PROBE: NOT DETECTED

## 2019-09-04 NOTE — TELEPHONE ENCOUNTER
Called and informed patient of results, patient will call us back to schedule the 3hrs glucose test. Patient verbalized understanding.     Notes recorded by Jessica Morejon MD on 9/4/2019 at 6:55 AM CDT  Pt needs 3hr gtt. Orders placed. Please inform

## 2019-09-05 ENCOUNTER — TELEPHONE (OUTPATIENT)
Dept: OBSTETRICS AND GYNECOLOGY | Facility: CLINIC | Age: 24
End: 2019-09-05

## 2019-09-05 LAB
BACTERIAL VAGINOSIS DNA: NEGATIVE
CANDIDA GLABRATA DNA: NEGATIVE
CANDIDA KRUSEI DNA: NEGATIVE
CANDIDA RRNA VAG QL PROBE: NEGATIVE
T VAGINALIS RRNA GENITAL QL PROBE: NEGATIVE

## 2019-09-12 ENCOUNTER — LAB VISIT (OUTPATIENT)
Dept: LAB | Facility: HOSPITAL | Age: 24
End: 2019-09-12
Attending: OBSTETRICS & GYNECOLOGY
Payer: MEDICAID

## 2019-09-12 DIAGNOSIS — O99.810 ABNORMAL GLUCOSE TOLERANCE AFFECTING PREGNANCY, ANTEPARTUM: ICD-10-CM

## 2019-09-12 LAB
GLUCOSE SERPL-MCNC: 106 MG/DL
GLUCOSE SERPL-MCNC: 173 MG/DL
GLUCOSE SERPL-MCNC: 75 MG/DL (ref 70–110)
GLUCOSE SERPL-MCNC: 91 MG/DL

## 2019-09-12 PROCEDURE — 36415 COLL VENOUS BLD VENIPUNCTURE: CPT

## 2019-09-12 PROCEDURE — 82951 GLUCOSE TOLERANCE TEST (GTT): CPT

## 2019-09-12 PROCEDURE — 82952 GTT-ADDED SAMPLES: CPT

## 2019-09-17 ENCOUNTER — TELEPHONE (OUTPATIENT)
Dept: OBSTETRICS AND GYNECOLOGY | Facility: CLINIC | Age: 24
End: 2019-09-17

## 2019-09-17 NOTE — TELEPHONE ENCOUNTER
Called patient for results NO answer left a voice message to call us back.    Notes recorded by Jessica Morejon MD on 9/13/2019 at 5:47 PM CDT  Rosa Maria 3hr gtt

## 2019-10-07 ENCOUNTER — ROUTINE PRENATAL (OUTPATIENT)
Dept: OBSTETRICS AND GYNECOLOGY | Facility: CLINIC | Age: 24
End: 2019-10-07
Payer: MEDICAID

## 2019-10-07 VITALS
SYSTOLIC BLOOD PRESSURE: 104 MMHG | WEIGHT: 111.13 LBS | DIASTOLIC BLOOD PRESSURE: 60 MMHG | BODY MASS INDEX: 22.44 KG/M2

## 2019-10-07 DIAGNOSIS — Z34.03 ENCOUNTER FOR SUPERVISION OF NORMAL FIRST PREGNANCY IN THIRD TRIMESTER: Primary | ICD-10-CM

## 2019-10-07 PROCEDURE — 99213 OFFICE O/P EST LOW 20 MIN: CPT | Mod: TH,S$PBB,, | Performed by: OBSTETRICS & GYNECOLOGY

## 2019-10-07 PROCEDURE — 99213 PR OFFICE/OUTPT VISIT, EST, LEVL III, 20-29 MIN: ICD-10-PCS | Mod: TH,S$PBB,, | Performed by: OBSTETRICS & GYNECOLOGY

## 2019-10-07 PROCEDURE — 99999 PR PBB SHADOW E&M-EST. PATIENT-LVL II: ICD-10-PCS | Mod: PBBFAC,,, | Performed by: OBSTETRICS & GYNECOLOGY

## 2019-10-07 PROCEDURE — 99212 OFFICE O/P EST SF 10 MIN: CPT | Mod: PBBFAC,TH,PO | Performed by: OBSTETRICS & GYNECOLOGY

## 2019-10-07 PROCEDURE — 87086 URINE CULTURE/COLONY COUNT: CPT

## 2019-10-07 PROCEDURE — 99999 PR PBB SHADOW E&M-EST. PATIENT-LVL II: CPT | Mod: PBBFAC,,, | Performed by: OBSTETRICS & GYNECOLOGY

## 2019-10-07 NOTE — PROGRESS NOTES
Good fm.  Denies ctx, vb, lof. Breastfeeding. Discussed lsil and colpo s/p delivery. Breast and bottle feeding. Discussed contraception options. Mirena order placed

## 2019-10-09 LAB — BACTERIA UR CULT: NO GROWTH

## 2019-10-21 ENCOUNTER — ROUTINE PRENATAL (OUTPATIENT)
Dept: OBSTETRICS AND GYNECOLOGY | Facility: CLINIC | Age: 24
End: 2019-10-21
Payer: MEDICAID

## 2019-10-21 VITALS — SYSTOLIC BLOOD PRESSURE: 104 MMHG | DIASTOLIC BLOOD PRESSURE: 62 MMHG | BODY MASS INDEX: 23.33 KG/M2 | WEIGHT: 115.5 LBS

## 2019-10-21 DIAGNOSIS — Z34.03 ENCOUNTER FOR SUPERVISION OF NORMAL FIRST PREGNANCY IN THIRD TRIMESTER: Primary | ICD-10-CM

## 2019-10-21 PROCEDURE — 99212 OFFICE O/P EST SF 10 MIN: CPT | Mod: PBBFAC,TH,PO | Performed by: OBSTETRICS & GYNECOLOGY

## 2019-10-21 PROCEDURE — 99999 PR PBB SHADOW E&M-EST. PATIENT-LVL II: CPT | Mod: PBBFAC,,, | Performed by: OBSTETRICS & GYNECOLOGY

## 2019-10-21 PROCEDURE — 99213 PR OFFICE/OUTPT VISIT, EST, LEVL III, 20-29 MIN: ICD-10-PCS | Mod: TH,S$PBB,, | Performed by: OBSTETRICS & GYNECOLOGY

## 2019-10-21 PROCEDURE — 99213 OFFICE O/P EST LOW 20 MIN: CPT | Mod: TH,S$PBB,, | Performed by: OBSTETRICS & GYNECOLOGY

## 2019-10-21 PROCEDURE — 99999 PR PBB SHADOW E&M-EST. PATIENT-LVL II: ICD-10-PCS | Mod: PBBFAC,,, | Performed by: OBSTETRICS & GYNECOLOGY

## 2019-10-21 NOTE — PROGRESS NOTES
Good fm.  Denies ctx, vb, lof. Reports vaginal pressure at times. Normal exam, cervix closed. Given precautions. discussed tdap/flu

## 2019-11-06 ENCOUNTER — CLINICAL SUPPORT (OUTPATIENT)
Dept: OBSTETRICS AND GYNECOLOGY | Facility: CLINIC | Age: 24
End: 2019-11-06
Payer: MEDICAID

## 2019-11-06 ENCOUNTER — ROUTINE PRENATAL (OUTPATIENT)
Dept: OBSTETRICS AND GYNECOLOGY | Facility: CLINIC | Age: 24
End: 2019-11-06
Payer: MEDICAID

## 2019-11-06 VITALS
BODY MASS INDEX: 24.49 KG/M2 | SYSTOLIC BLOOD PRESSURE: 108 MMHG | WEIGHT: 121.25 LBS | DIASTOLIC BLOOD PRESSURE: 66 MMHG

## 2019-11-06 DIAGNOSIS — Z23 FLU VACCINE NEED: ICD-10-CM

## 2019-11-06 DIAGNOSIS — Z23 NEED FOR DIPHTHERIA-TETANUS-PERTUSSIS (TDAP) VACCINE: Primary | ICD-10-CM

## 2019-11-06 DIAGNOSIS — Z34.03 ENCOUNTER FOR SUPERVISION OF NORMAL FIRST PREGNANCY IN THIRD TRIMESTER: Primary | ICD-10-CM

## 2019-11-06 PROCEDURE — 90715 TDAP VACCINE 7 YRS/> IM: CPT | Mod: PBBFAC,PO

## 2019-11-06 PROCEDURE — 99212 OFFICE O/P EST SF 10 MIN: CPT | Mod: PBBFAC,TH,PO,25 | Performed by: OBSTETRICS & GYNECOLOGY

## 2019-11-06 PROCEDURE — 90686 IIV4 VACC NO PRSV 0.5 ML IM: CPT | Mod: PBBFAC,PO

## 2019-11-06 PROCEDURE — 99213 OFFICE O/P EST LOW 20 MIN: CPT | Mod: TH,S$PBB,, | Performed by: OBSTETRICS & GYNECOLOGY

## 2019-11-06 PROCEDURE — 99999 PR PBB SHADOW E&M-EST. PATIENT-LVL II: CPT | Mod: PBBFAC,,, | Performed by: OBSTETRICS & GYNECOLOGY

## 2019-11-06 PROCEDURE — 99213 PR OFFICE/OUTPT VISIT, EST, LEVL III, 20-29 MIN: ICD-10-PCS | Mod: TH,S$PBB,, | Performed by: OBSTETRICS & GYNECOLOGY

## 2019-11-06 PROCEDURE — 99999 PR PBB SHADOW E&M-EST. PATIENT-LVL II: ICD-10-PCS | Mod: PBBFAC,,, | Performed by: OBSTETRICS & GYNECOLOGY

## 2019-11-06 PROCEDURE — 90472 IMMUNIZATION ADMIN EACH ADD: CPT | Mod: PBBFAC,PO

## 2019-11-06 NOTE — PROGRESS NOTES
11/6/19 at 1353 administered 0.5 ml Fluzone Quadrivalent to R deltoid.   Pt tolerated well.   Pt advised to wait 10 minutes before leaving the office.   Pt verbalized understanding.   Pt received VIS.  Lot: [id number]  Exp: 6/30/20  Man: Abrahanofi Pasteur       11/6/19 at 1353 administered 0.5 ml of Adacel (Tdap) to L deltoid.   Pt tolerated well.   Pt advised to wait 15 minutes before leaving the office.   Pt verbalized understanding.   Pt received VIS.  Lot: [id number]  Exp: 11/5/21  Man: Sanofi Pasteur

## 2019-11-18 ENCOUNTER — PROCEDURE VISIT (OUTPATIENT)
Dept: OBSTETRICS AND GYNECOLOGY | Facility: CLINIC | Age: 24
End: 2019-11-18
Payer: MEDICAID

## 2019-11-18 ENCOUNTER — ROUTINE PRENATAL (OUTPATIENT)
Dept: OBSTETRICS AND GYNECOLOGY | Facility: CLINIC | Age: 24
End: 2019-11-18
Payer: MEDICAID

## 2019-11-18 ENCOUNTER — LAB VISIT (OUTPATIENT)
Dept: LAB | Facility: HOSPITAL | Age: 24
End: 2019-11-18
Attending: OBSTETRICS & GYNECOLOGY
Payer: MEDICAID

## 2019-11-18 VITALS
DIASTOLIC BLOOD PRESSURE: 66 MMHG | BODY MASS INDEX: 24.67 KG/M2 | WEIGHT: 122.13 LBS | SYSTOLIC BLOOD PRESSURE: 108 MMHG

## 2019-11-18 DIAGNOSIS — Z36.85 ANTENATAL SCREENING FOR STREPTOCOCCUS B: ICD-10-CM

## 2019-11-18 DIAGNOSIS — Z34.03 ENCOUNTER FOR SUPERVISION OF NORMAL FIRST PREGNANCY IN THIRD TRIMESTER: ICD-10-CM

## 2019-11-18 DIAGNOSIS — Z32.01 POSITIVE URINE PREGNANCY TEST: ICD-10-CM

## 2019-11-18 DIAGNOSIS — Z34.02 SUPERVISION OF NORMAL FIRST PREGNANCY IN SECOND TRIMESTER: Primary | ICD-10-CM

## 2019-11-18 LAB
BASOPHILS # BLD AUTO: 0.02 K/UL (ref 0–0.2)
BASOPHILS NFR BLD: 0.2 % (ref 0–1.9)
DIFFERENTIAL METHOD: ABNORMAL
EOSINOPHIL # BLD AUTO: 0.1 K/UL (ref 0–0.5)
EOSINOPHIL NFR BLD: 1.3 % (ref 0–8)
ERYTHROCYTE [DISTWIDTH] IN BLOOD BY AUTOMATED COUNT: 13.8 % (ref 11.5–14.5)
HCT VFR BLD AUTO: 38.7 % (ref 37–48.5)
HGB BLD-MCNC: 12.9 G/DL (ref 12–16)
LYMPHOCYTES # BLD AUTO: 1.6 K/UL (ref 1–4.8)
LYMPHOCYTES NFR BLD: 17.5 % (ref 18–48)
MCH RBC QN AUTO: 31.2 PG (ref 27–31)
MCHC RBC AUTO-ENTMCNC: 33.3 G/DL (ref 32–36)
MCV RBC AUTO: 94 FL (ref 82–98)
MONOCYTES # BLD AUTO: 0.8 K/UL (ref 0.3–1)
MONOCYTES NFR BLD: 8.6 % (ref 4–15)
NEUTROPHILS # BLD AUTO: 6.5 K/UL (ref 1.8–7.7)
NEUTROPHILS NFR BLD: 72.4 % (ref 38–73)
PLATELET # BLD AUTO: 168 K/UL (ref 150–350)
PMV BLD AUTO: 10.7 FL (ref 9.2–12.9)
RBC # BLD AUTO: 4.13 M/UL (ref 4–5.4)
WBC # BLD AUTO: 9.08 K/UL (ref 3.9–12.7)

## 2019-11-18 PROCEDURE — 86703 HIV-1/HIV-2 1 RESULT ANTBDY: CPT

## 2019-11-18 PROCEDURE — 99499 NO LOS: ICD-10-PCS | Mod: S$PBB,,, | Performed by: PEDIATRICS

## 2019-11-18 PROCEDURE — 99999 PR PBB SHADOW E&M-EST. PATIENT-LVL II: ICD-10-PCS | Mod: PBBFAC,,, | Performed by: OBSTETRICS & GYNECOLOGY

## 2019-11-18 PROCEDURE — 99499 UNLISTED E&M SERVICE: CPT | Mod: S$PBB,,, | Performed by: PEDIATRICS

## 2019-11-18 PROCEDURE — 86592 SYPHILIS TEST NON-TREP QUAL: CPT

## 2019-11-18 PROCEDURE — 87491 CHLMYD TRACH DNA AMP PROBE: CPT

## 2019-11-18 PROCEDURE — 99213 OFFICE O/P EST LOW 20 MIN: CPT | Mod: TH,S$PBB,, | Performed by: OBSTETRICS & GYNECOLOGY

## 2019-11-18 PROCEDURE — 85025 COMPLETE CBC W/AUTO DIFF WBC: CPT

## 2019-11-18 PROCEDURE — 99213 PR OFFICE/OUTPT VISIT, EST, LEVL III, 20-29 MIN: ICD-10-PCS | Mod: TH,S$PBB,, | Performed by: OBSTETRICS & GYNECOLOGY

## 2019-11-18 PROCEDURE — 99999 PR PBB SHADOW E&M-EST. PATIENT-LVL II: CPT | Mod: PBBFAC,,, | Performed by: OBSTETRICS & GYNECOLOGY

## 2019-11-18 PROCEDURE — 99212 OFFICE O/P EST SF 10 MIN: CPT | Mod: PBBFAC,TH,PO | Performed by: OBSTETRICS & GYNECOLOGY

## 2019-11-18 PROCEDURE — 87081 CULTURE SCREEN ONLY: CPT

## 2019-11-18 PROCEDURE — 36415 COLL VENOUS BLD VENIPUNCTURE: CPT

## 2019-11-18 NOTE — PROGRESS NOTES
Good fm.  Denies regular ctx, vb, lof. 3rdT labs ordered and GBBS collected. Given precautions. U/S reviewed.

## 2019-11-19 LAB
HIV 1+2 AB+HIV1 P24 AG SERPL QL IA: NEGATIVE
RPR SER QL: NORMAL

## 2019-11-20 ENCOUNTER — TELEPHONE (OUTPATIENT)
Dept: OBSTETRICS AND GYNECOLOGY | Facility: CLINIC | Age: 24
End: 2019-11-20

## 2019-11-20 LAB
C TRACH DNA SPEC QL NAA+PROBE: NOT DETECTED
N GONORRHOEA DNA SPEC QL NAA+PROBE: NOT DETECTED

## 2019-11-20 NOTE — TELEPHONE ENCOUNTER
Contacted pt via Language line and informed of -gcct, and negative labs, Patient verbalized understanding.

## 2019-11-20 NOTE — TELEPHONE ENCOUNTER
----- Message from Jessica Morejon MD sent at 11/20/2019  6:36 AM CST -----  Neg gcct please inform

## 2019-11-21 LAB — BACTERIA SPEC AEROBE CULT: NORMAL

## 2019-12-02 ENCOUNTER — TELEPHONE (OUTPATIENT)
Dept: OBSTETRICS AND GYNECOLOGY | Facility: CLINIC | Age: 24
End: 2019-12-02

## 2019-12-02 ENCOUNTER — ROUTINE PRENATAL (OUTPATIENT)
Dept: OBSTETRICS AND GYNECOLOGY | Facility: CLINIC | Age: 24
End: 2019-12-02
Payer: MEDICAID

## 2019-12-02 VITALS
SYSTOLIC BLOOD PRESSURE: 116 MMHG | DIASTOLIC BLOOD PRESSURE: 80 MMHG | WEIGHT: 127.88 LBS | BODY MASS INDEX: 25.83 KG/M2

## 2019-12-02 DIAGNOSIS — Z34.03 ENCOUNTER FOR SUPERVISION OF NORMAL FIRST PREGNANCY IN THIRD TRIMESTER: Primary | ICD-10-CM

## 2019-12-02 DIAGNOSIS — Z3A.37 37 WEEKS GESTATION OF PREGNANCY: ICD-10-CM

## 2019-12-02 PROCEDURE — 99212 OFFICE O/P EST SF 10 MIN: CPT | Mod: PBBFAC,TH,PO | Performed by: OBSTETRICS & GYNECOLOGY

## 2019-12-02 PROCEDURE — 99999 PR PBB SHADOW E&M-EST. PATIENT-LVL II: CPT | Mod: PBBFAC,,, | Performed by: OBSTETRICS & GYNECOLOGY

## 2019-12-02 PROCEDURE — 99213 PR OFFICE/OUTPT VISIT, EST, LEVL III, 20-29 MIN: ICD-10-PCS | Mod: TH,S$PBB,, | Performed by: OBSTETRICS & GYNECOLOGY

## 2019-12-02 PROCEDURE — 99999 PR PBB SHADOW E&M-EST. PATIENT-LVL II: ICD-10-PCS | Mod: PBBFAC,,, | Performed by: OBSTETRICS & GYNECOLOGY

## 2019-12-02 PROCEDURE — 99213 OFFICE O/P EST LOW 20 MIN: CPT | Mod: TH,S$PBB,, | Performed by: OBSTETRICS & GYNECOLOGY

## 2019-12-02 NOTE — TELEPHONE ENCOUNTER
----- Message from Katerine Kumar sent at 12/2/2019 10:50 AM CST -----  Contact: Gautam Munoz patient would like refills on her prenatal vitamins please. Thank youLisa

## 2019-12-09 ENCOUNTER — ROUTINE PRENATAL (OUTPATIENT)
Dept: OBSTETRICS AND GYNECOLOGY | Facility: CLINIC | Age: 24
End: 2019-12-09
Payer: MEDICAID

## 2019-12-09 VITALS
BODY MASS INDEX: 25.91 KG/M2 | DIASTOLIC BLOOD PRESSURE: 72 MMHG | WEIGHT: 128.31 LBS | SYSTOLIC BLOOD PRESSURE: 106 MMHG

## 2019-12-09 DIAGNOSIS — Z34.03 ENCOUNTER FOR SUPERVISION OF NORMAL FIRST PREGNANCY IN THIRD TRIMESTER: Primary | ICD-10-CM

## 2019-12-09 PROCEDURE — 99213 OFFICE O/P EST LOW 20 MIN: CPT | Mod: TH,S$PBB,, | Performed by: OBSTETRICS & GYNECOLOGY

## 2019-12-09 PROCEDURE — 99999 PR PBB SHADOW E&M-EST. PATIENT-LVL II: ICD-10-PCS | Mod: PBBFAC,,, | Performed by: OBSTETRICS & GYNECOLOGY

## 2019-12-09 PROCEDURE — 99999 PR PBB SHADOW E&M-EST. PATIENT-LVL II: CPT | Mod: PBBFAC,,, | Performed by: OBSTETRICS & GYNECOLOGY

## 2019-12-09 PROCEDURE — 99212 OFFICE O/P EST SF 10 MIN: CPT | Mod: PBBFAC,TH,PO | Performed by: OBSTETRICS & GYNECOLOGY

## 2019-12-09 PROCEDURE — 99213 PR OFFICE/OUTPT VISIT, EST, LEVL III, 20-29 MIN: ICD-10-PCS | Mod: TH,S$PBB,, | Performed by: OBSTETRICS & GYNECOLOGY

## 2019-12-12 ENCOUNTER — HOSPITAL ENCOUNTER (INPATIENT)
Facility: HOSPITAL | Age: 24
LOS: 3 days | Discharge: HOME | End: 2019-12-15
Attending: OBSTETRICS & GYNECOLOGY | Admitting: OBSTETRICS & GYNECOLOGY
Payer: MEDICAID

## 2019-12-12 ENCOUNTER — ANESTHESIA (OUTPATIENT)
Dept: OBSTETRICS AND GYNECOLOGY | Facility: HOSPITAL | Age: 24
End: 2019-12-12

## 2019-12-12 ENCOUNTER — ANESTHESIA EVENT (OUTPATIENT)
Dept: OBSTETRICS AND GYNECOLOGY | Facility: HOSPITAL | Age: 24
End: 2019-12-12

## 2019-12-12 DIAGNOSIS — R10.9 ABDOMINAL PAIN AFFECTING PREGNANCY: ICD-10-CM

## 2019-12-12 DIAGNOSIS — O26.899 ABDOMINAL PAIN AFFECTING PREGNANCY: ICD-10-CM

## 2019-12-12 DIAGNOSIS — Z37.9 NORMAL LABOR: ICD-10-CM

## 2019-12-12 LAB
ABO + RH BLD: NORMAL
BASOPHILS # BLD AUTO: 0.02 K/UL (ref 0–0.2)
BASOPHILS NFR BLD: 0.2 % (ref 0–1.9)
BLD GP AB SCN CELLS X3 SERPL QL: NORMAL
DIFFERENTIAL METHOD: ABNORMAL
EOSINOPHIL # BLD AUTO: 0.1 K/UL (ref 0–0.5)
EOSINOPHIL NFR BLD: 0.9 % (ref 0–8)
ERYTHROCYTE [DISTWIDTH] IN BLOOD BY AUTOMATED COUNT: 14.2 % (ref 11.5–14.5)
HCT VFR BLD AUTO: 37.6 % (ref 37–48.5)
HGB BLD-MCNC: 12.7 G/DL (ref 12–16)
LYMPHOCYTES # BLD AUTO: 2 K/UL (ref 1–4.8)
LYMPHOCYTES NFR BLD: 21.5 % (ref 18–48)
MCH RBC QN AUTO: 31.8 PG (ref 27–31)
MCHC RBC AUTO-ENTMCNC: 33.8 G/DL (ref 32–36)
MCV RBC AUTO: 94 FL (ref 82–98)
MONOCYTES # BLD AUTO: 0.9 K/UL (ref 0.3–1)
MONOCYTES NFR BLD: 9.7 % (ref 4–15)
NEUTROPHILS # BLD AUTO: 6.1 K/UL (ref 1.8–7.7)
NEUTROPHILS NFR BLD: 67.7 % (ref 38–73)
PLATELET # BLD AUTO: 146 K/UL (ref 150–350)
PMV BLD AUTO: 11.7 FL (ref 9.2–12.9)
RBC # BLD AUTO: 4 M/UL (ref 4–5.4)
WBC # BLD AUTO: 9.17 K/UL (ref 3.9–12.7)

## 2019-12-12 PROCEDURE — 63600175 PHARM REV CODE 636 W HCPCS: Performed by: OBSTETRICS & GYNECOLOGY

## 2019-12-12 PROCEDURE — 72100003 HC LABOR CARE, EA. ADDL. 8 HRS

## 2019-12-12 PROCEDURE — 59025 FETAL NON-STRESS TEST: CPT | Mod: 26,,, | Performed by: OBSTETRICS & GYNECOLOGY

## 2019-12-12 PROCEDURE — 11000001 HC ACUTE MED/SURG PRIVATE ROOM

## 2019-12-12 PROCEDURE — 86901 BLOOD TYPING SEROLOGIC RH(D): CPT

## 2019-12-12 PROCEDURE — 59025 PR FETAL 2N-STRESS TEST: ICD-10-PCS | Mod: 26,,, | Performed by: OBSTETRICS & GYNECOLOGY

## 2019-12-12 PROCEDURE — 86592 SYPHILIS TEST NON-TREP QUAL: CPT

## 2019-12-12 PROCEDURE — 85025 COMPLETE CBC W/AUTO DIFF WBC: CPT

## 2019-12-12 PROCEDURE — 72100002 HC LABOR CARE, 1ST 8 HOURS

## 2019-12-12 RX ORDER — SODIUM CHLORIDE, SODIUM LACTATE, POTASSIUM CHLORIDE, CALCIUM CHLORIDE 600; 310; 30; 20 MG/100ML; MG/100ML; MG/100ML; MG/100ML
INJECTION, SOLUTION INTRAVENOUS CONTINUOUS
Status: DISCONTINUED | OUTPATIENT
Start: 2019-12-12 | End: 2019-12-13

## 2019-12-12 RX ADMIN — SODIUM CHLORIDE, POTASSIUM CHLORIDE, SODIUM LACTATE AND CALCIUM CHLORIDE: 600; 310; 30; 20 INJECTION, SOLUTION INTRAVENOUS at 03:12

## 2019-12-12 RX ADMIN — SODIUM CHLORIDE, POTASSIUM CHLORIDE, SODIUM LACTATE AND CALCIUM CHLORIDE: 600; 310; 30; 20 INJECTION, SOLUTION INTRAVENOUS at 12:12

## 2019-12-12 NOTE — ANESTHESIA PREPROCEDURE EVALUATION
2019  Jessie Ayers is a 24 y.o., female Serbian speaking only-- at 39 weeks admitted in labor.  Patient denies that she will want an epidural, and does not want to sign anesthesia consent at this time.    Pre-op Assessment    I have reviewed the Patient Summary Reports.     I have reviewed the Nursing Notes.   I have reviewed the Medications.     Review of Systems  Cardiovascular:   Exercise tolerance: good    Pulmonary:  Pulmonary Normal    Renal/:  Renal/ Normal     Hepatic/GI:  Hepatic/GI Normal    OB/GYN/PEDS:  Gravid uterus  Term pregnancy       Physical Exam  General:  Well nourished    Airway/Jaw/Neck:  Airway Findings: Mouth Opening: Normal Tongue: Normal  General Airway Assessment: Adult  Mallampati: III  Improves to II with phonation.  TM Distance: 4 - 6 cm      Dental:  DENTAL FINDINGS: Normal   Chest/Lungs:  Chest/Lungs Clear    Heart/Vascular:  Heart Findings: Normal

## 2019-12-12 NOTE — PLAN OF CARE
pt arrived on unit from home, pt arrives with kaylene to translate, gowned and efm applied, head to toe assessment completed, history rev, explained plan of care, pt complains of lower abd pain 6/10 and vaginal spotting.  Denies leaking fluid and confirms +FM.  Call light in reach, family at the bedside

## 2019-12-13 ENCOUNTER — ANESTHESIA EVENT (OUTPATIENT)
Dept: OBSTETRICS AND GYNECOLOGY | Facility: HOSPITAL | Age: 24
End: 2019-12-13
Payer: MEDICAID

## 2019-12-13 ENCOUNTER — ANESTHESIA (OUTPATIENT)
Dept: OBSTETRICS AND GYNECOLOGY | Facility: HOSPITAL | Age: 24
End: 2019-12-13
Payer: MEDICAID

## 2019-12-13 PROBLEM — Z37.9 NORMAL LABOR: Status: ACTIVE | Noted: 2019-12-13

## 2019-12-13 LAB — RPR SER QL: NORMAL

## 2019-12-13 PROCEDURE — 25000003 PHARM REV CODE 250: Performed by: ANESTHESIOLOGY

## 2019-12-13 PROCEDURE — 25000003 PHARM REV CODE 250: Performed by: STUDENT IN AN ORGANIZED HEALTH CARE EDUCATION/TRAINING PROGRAM

## 2019-12-13 PROCEDURE — 62326 NJX INTERLAMINAR LMBR/SAC: CPT | Performed by: STUDENT IN AN ORGANIZED HEALTH CARE EDUCATION/TRAINING PROGRAM

## 2019-12-13 PROCEDURE — 72200006 HC VAGINAL DELIVERY LEVEL III

## 2019-12-13 PROCEDURE — 25000003 PHARM REV CODE 250: Performed by: OBSTETRICS & GYNECOLOGY

## 2019-12-13 PROCEDURE — 59409 OBSTETRICAL CARE: CPT | Mod: GB,,, | Performed by: OBSTETRICS & GYNECOLOGY

## 2019-12-13 PROCEDURE — 51702 INSERT TEMP BLADDER CATH: CPT

## 2019-12-13 PROCEDURE — 63600175 PHARM REV CODE 636 W HCPCS: Performed by: OBSTETRICS & GYNECOLOGY

## 2019-12-13 PROCEDURE — 27201127 HC VACUUM EXTRACTOR

## 2019-12-13 PROCEDURE — 72100003 HC LABOR CARE, EA. ADDL. 8 HRS

## 2019-12-13 PROCEDURE — 72200005 HC VAGINAL DELIVERY LEVEL II

## 2019-12-13 PROCEDURE — 27200710 HC EPIDURAL INFUSION PUMP SET: Performed by: STUDENT IN AN ORGANIZED HEALTH CARE EDUCATION/TRAINING PROGRAM

## 2019-12-13 PROCEDURE — 27800516 HC TRAY, EPIDURAL COMBO: Performed by: STUDENT IN AN ORGANIZED HEALTH CARE EDUCATION/TRAINING PROGRAM

## 2019-12-13 PROCEDURE — 59409 PR OBSTETRICAL CARE,VAG DELIV ONLY: ICD-10-PCS | Mod: GB,,, | Performed by: OBSTETRICS & GYNECOLOGY

## 2019-12-13 PROCEDURE — 11000001 HC ACUTE MED/SURG PRIVATE ROOM

## 2019-12-13 RX ORDER — DOCUSATE SODIUM 100 MG/1
200 CAPSULE, LIQUID FILLED ORAL 2 TIMES DAILY PRN
Status: DISCONTINUED | OUTPATIENT
Start: 2019-12-13 | End: 2019-12-15 | Stop reason: HOSPADM

## 2019-12-13 RX ORDER — SODIUM CHLORIDE 9 MG/ML
INJECTION, SOLUTION INTRAVENOUS CONTINUOUS
Status: DISCONTINUED | OUTPATIENT
Start: 2019-12-13 | End: 2019-12-13

## 2019-12-13 RX ORDER — EPHEDRINE SULFATE 50 MG/ML
INJECTION, SOLUTION INTRAVENOUS
Status: DISCONTINUED | OUTPATIENT
Start: 2019-12-13 | End: 2019-12-13

## 2019-12-13 RX ORDER — FAMOTIDINE 10 MG/ML
20 INJECTION INTRAVENOUS ONCE
Status: CANCELLED | OUTPATIENT
Start: 2019-12-13 | End: 2019-12-13

## 2019-12-13 RX ORDER — ONDANSETRON 8 MG/1
8 TABLET, ORALLY DISINTEGRATING ORAL EVERY 8 HOURS PRN
Status: DISCONTINUED | OUTPATIENT
Start: 2019-12-13 | End: 2019-12-15 | Stop reason: HOSPADM

## 2019-12-13 RX ORDER — ACETAMINOPHEN 325 MG/1
650 TABLET ORAL EVERY 6 HOURS PRN
Status: DISCONTINUED | OUTPATIENT
Start: 2019-12-13 | End: 2019-12-15 | Stop reason: HOSPADM

## 2019-12-13 RX ORDER — SODIUM CHLORIDE 9 MG/ML
INJECTION, SOLUTION INTRAVENOUS
Status: DISCONTINUED | OUTPATIENT
Start: 2019-12-13 | End: 2019-12-13

## 2019-12-13 RX ORDER — SODIUM CITRATE AND CITRIC ACID MONOHYDRATE 334; 500 MG/5ML; MG/5ML
30 SOLUTION ORAL ONCE
Status: CANCELLED | OUTPATIENT
Start: 2019-12-13 | End: 2019-12-13

## 2019-12-13 RX ORDER — DIPHENHYDRAMINE HYDROCHLORIDE 50 MG/ML
25 INJECTION INTRAMUSCULAR; INTRAVENOUS EVERY 4 HOURS PRN
Status: DISCONTINUED | OUTPATIENT
Start: 2019-12-13 | End: 2019-12-15 | Stop reason: HOSPADM

## 2019-12-13 RX ORDER — SIMETHICONE 80 MG
1 TABLET,CHEWABLE ORAL EVERY 6 HOURS PRN
Status: DISCONTINUED | OUTPATIENT
Start: 2019-12-13 | End: 2019-12-15 | Stop reason: HOSPADM

## 2019-12-13 RX ORDER — CALCIUM CARBONATE 200(500)MG
500 TABLET,CHEWABLE ORAL 3 TIMES DAILY PRN
Status: DISCONTINUED | OUTPATIENT
Start: 2019-12-13 | End: 2019-12-15 | Stop reason: HOSPADM

## 2019-12-13 RX ORDER — OXYTOCIN/RINGER'S LACTATE 30/500 ML
95 PLASTIC BAG, INJECTION (ML) INTRAVENOUS ONCE
Status: COMPLETED | OUTPATIENT
Start: 2019-12-13 | End: 2019-12-13

## 2019-12-13 RX ORDER — FENTANYL/ROPIVACAINE/NS/PF 2MCG/ML-.1
PLASTIC BAG, INJECTION (ML) EPIDURAL
Status: COMPLETED
Start: 2019-12-13 | End: 2019-12-13

## 2019-12-13 RX ORDER — SIMETHICONE 80 MG
1 TABLET,CHEWABLE ORAL 4 TIMES DAILY PRN
Status: DISCONTINUED | OUTPATIENT
Start: 2019-12-13 | End: 2019-12-13 | Stop reason: SDUPTHER

## 2019-12-13 RX ORDER — IBUPROFEN 600 MG/1
600 TABLET ORAL EVERY 6 HOURS PRN
Qty: 30 TABLET | Refills: 0 | Status: SHIPPED | OUTPATIENT
Start: 2019-12-13 | End: 2021-09-16

## 2019-12-13 RX ORDER — ONDANSETRON 8 MG/1
8 TABLET, ORALLY DISINTEGRATING ORAL EVERY 8 HOURS PRN
Status: DISCONTINUED | OUTPATIENT
Start: 2019-12-13 | End: 2019-12-13 | Stop reason: SDUPTHER

## 2019-12-13 RX ORDER — IBUPROFEN 600 MG/1
600 TABLET ORAL EVERY 6 HOURS PRN
Status: DISCONTINUED | OUTPATIENT
Start: 2019-12-13 | End: 2019-12-15 | Stop reason: HOSPADM

## 2019-12-13 RX ORDER — NALOXONE HCL 0.4 MG/ML
0.02 VIAL (ML) INJECTION
Status: DISCONTINUED | OUTPATIENT
Start: 2019-12-13 | End: 2019-12-15 | Stop reason: HOSPADM

## 2019-12-13 RX ORDER — DIPHENHYDRAMINE HCL 25 MG
25 CAPSULE ORAL EVERY 4 HOURS PRN
Status: DISCONTINUED | OUTPATIENT
Start: 2019-12-13 | End: 2019-12-15 | Stop reason: HOSPADM

## 2019-12-13 RX ORDER — HYDROCORTISONE 25 MG/G
CREAM TOPICAL 3 TIMES DAILY PRN
Status: DISCONTINUED | OUTPATIENT
Start: 2019-12-13 | End: 2019-12-15 | Stop reason: HOSPADM

## 2019-12-13 RX ORDER — FENTANYL/ROPIVACAINE/NS/PF 2MCG/ML-.1
PLASTIC BAG, INJECTION (ML) EPIDURAL CONTINUOUS PRN
Status: DISCONTINUED | OUTPATIENT
Start: 2019-12-13 | End: 2019-12-13

## 2019-12-13 RX ORDER — OXYTOCIN/RINGER'S LACTATE 30/500 ML
334 PLASTIC BAG, INJECTION (ML) INTRAVENOUS ONCE
Status: COMPLETED | OUTPATIENT
Start: 2019-12-13 | End: 2019-12-13

## 2019-12-13 RX ORDER — OXYTOCIN/RINGER'S LACTATE 30/500 ML
95 PLASTIC BAG, INJECTION (ML) INTRAVENOUS ONCE
Status: CANCELLED | OUTPATIENT
Start: 2019-12-13 | End: 2019-12-13

## 2019-12-13 RX ORDER — PRENATAL WITH FERROUS FUM AND FOLIC ACID 3080; 920; 120; 400; 22; 1.84; 3; 20; 10; 1; 12; 200; 27; 25; 2 [IU]/1; [IU]/1; MG/1; [IU]/1; MG/1; MG/1; MG/1; MG/1; MG/1; MG/1; UG/1; MG/1; MG/1; MG/1; MG/1
1 TABLET ORAL DAILY
Status: DISCONTINUED | OUTPATIENT
Start: 2019-12-14 | End: 2019-12-15 | Stop reason: HOSPADM

## 2019-12-13 RX ORDER — OXYCODONE AND ACETAMINOPHEN 5; 325 MG/1; MG/1
1 TABLET ORAL EVERY 4 HOURS PRN
Status: DISCONTINUED | OUTPATIENT
Start: 2019-12-13 | End: 2019-12-15 | Stop reason: HOSPADM

## 2019-12-13 RX ORDER — OXYTOCIN/RINGER'S LACTATE 30/500 ML
2 PLASTIC BAG, INJECTION (ML) INTRAVENOUS CONTINUOUS
Status: DISCONTINUED | OUTPATIENT
Start: 2019-12-13 | End: 2019-12-13

## 2019-12-13 RX ADMIN — IBUPROFEN 600 MG: 600 TABLET, FILM COATED ORAL at 07:12

## 2019-12-13 RX ADMIN — OXYCODONE AND ACETAMINOPHEN 1 TABLET: 5; 325 TABLET ORAL at 07:12

## 2019-12-13 RX ADMIN — Medication 95 MILLI-UNITS/MIN: at 01:12

## 2019-12-13 RX ADMIN — SODIUM CHLORIDE, POTASSIUM CHLORIDE, SODIUM LACTATE AND CALCIUM CHLORIDE: 600; 310; 30; 20 INJECTION, SOLUTION INTRAVENOUS at 08:12

## 2019-12-13 RX ADMIN — SODIUM CHLORIDE, POTASSIUM CHLORIDE, SODIUM LACTATE AND CALCIUM CHLORIDE: 600; 310; 30; 20 INJECTION, SOLUTION INTRAVENOUS at 07:12

## 2019-12-13 RX ADMIN — Medication 334 MILLI-UNITS/MIN: at 01:12

## 2019-12-13 RX ADMIN — Medication 2 MILLI-UNITS/MIN: at 10:12

## 2019-12-13 RX ADMIN — EPHEDRINE SULFATE 25 MG: 50 INJECTION, SOLUTION INTRAMUSCULAR; INTRAVENOUS; SUBCUTANEOUS at 09:12

## 2019-12-13 RX ADMIN — Medication 12 ML/HR: at 08:12

## 2019-12-13 NOTE — NURSING
24 year old G1 at 39 1/7 in labor, SROM [facility] at 0620, previously had prolonged deceleration of FHR pattern, reactive now with moderate variability. Contractions occurring spontaneously q 3-4.5 minutes. Abdomen soft, nontender.  at bedside, supportive, wants epidural now. LR bolus in progress, anesthesia notified. Assessment translated per ESTRELLA Valdez.

## 2019-12-13 NOTE — NURSING
Dr. Morejon given update of contractions pattern q 3 minutes, decels post epidural with hypotension, resolved now, cervix 5/80/-2. Will start Pitocin as ordered.

## 2019-12-13 NOTE — NURSING
S/P Kiwi assisted vaginal birth under epidural anesthesia with 2nd degree perineal laceration. Epidural resolved, ambulating and voiding without difficulty, VSS. TO NUCU to visit baby and Mother Baby care.

## 2019-12-13 NOTE — NURSING
Hypotension post epidural with late decelerations resolved with fluid bolus and Ephedrine per anesthesia. Cervix 5/80/-2. Currently reactive FHR pattern, no decelerations. Leaking moderate meconium fluid- krishna care given.Tilted left side, encouraged to rest. Will observe closely.

## 2019-12-13 NOTE — H&P
NO DICTATION      PANCHO  dd: 12/12/2019 19:21:51 (CST)  td: 12/12/2019 19:30:23 (CST)  Doc ID   #[id number]  Job ID #[id number]    CC:

## 2019-12-13 NOTE — PROGRESS NOTES
"Hospital course:   Pt is a 23 yo  with IUP at 39w1d who presented to triage yesterday morning with complaint of abdominal pain. While being monitored to rule out labor- a prolonged fetal deceleration was noted. I was notified by nursing of deceleration at and instructed nursing to contact MD on call for management. Pt was placed in observation yesterday morning and managed by on call MD overnight with decision to wait until I arrived this morning before augmenting. Pt was 2cm at 6pm yesterday, 4cm at 4am and unchanged with my check at 8am.    S: 24 y.o.  at 39w1d, doing well. No complaints    O:  BP (!) 106/55   Pulse 61   Temp 98.6 °F (37 °C) (Oral)   Resp 18   Ht 5' 2" (1.575 m)   Wt 57.6 kg (127 lb)   LMP 2019 (Within Weeks)   SpO2 95%   Breastfeeding? Yes   BMI 23.23 kg/m²     FHT: 120, mod BTBV, +accels  [place]: ctx 4-5  SVE:/60/-3      ASSESSMENT: 39w1d   Patient Active Problem List   Diagnosis    Encounter for supervision of normal first pregnancy in third trimester    Abdominal pain affecting pregnancy       PLAN:    -Continue Close Maternal/Fetal Monitoring  -cervix is still at 4cm will need augmentation. I used  to explain hospital course and current plan. Secondary to history of prolonged deceleration and moderate meconium, I advised patient to receive epidural prior to starting pitocin in case of need for emergent delivery. Provided reassurance of Cat 1 strip currently. She is currently being preloaded for epidural placement. All questions answered. Will continue with pitocin     Jessica Morejon MD, FACOG  OB/GYN  Pager: [phone]      "

## 2019-12-13 NOTE — H&P
Pt admitted for observatons, now progressed to 4cm on own,  fht have been excellent all night, cat 1    pmh neg  psh neg  Allergies none  Sh neg  Fh nge      25 y/o G1 at 39 weeks admitted for observaton earlier today, had some spotting, no active labor  Uncomplicated care with dr browne this pregnancy     Pt has reactive nst, did have a variable deceleration just after lunchtime and another about 4pm  Otherwise fhr has been cat 1, and reactive  cx by me 2cm, 50-60%, soft, slight bloody show  No active labor, irreg contractions     Discussed case with dr browne,   Will admit for overnight obs with pitocin augmentation in am  If any distress with proceed with delivery           Pe, vss  Head/neck normal  abd gravid  cx now 4cm per rn  extr nroaml    Assessment spont labor  Plan, admit for delivery

## 2019-12-13 NOTE — PLAN OF CARE
Augmentation of labor in progress, reassuring FHR pattern currently, VSS, pain eliminated by epidural anesthesia.

## 2019-12-13 NOTE — ANESTHESIA PREPROCEDURE EVALUATION
2019  Jessie Ayers is a 24 y.o., female  term pregnancy       Patient Active Problem List   Diagnosis    Encounter for supervision of normal first pregnancy in third trimester    Abdominal pain affecting pregnancy       Past Medical History:   Diagnosis Date    Anemia        Review of patient's allergies indicates:  No Known Allergies    No past surgical history on file.    Lab Results   Component Value Date    WBC 9.17 2019    HGB 12.7 2019    HCT 37.6 2019     (L) 2019    ALT 13 2019    AST 17 2019     2019    K 3.9 2019     2019    CREATININE 0.7 2019    BUN 10 2019    CO2 27 2019         Anesthesia Evaluation    I have reviewed the Patient Summary Reports.    I have reviewed the Nursing Notes.   I have reviewed the Medications.     Review of Systems  Social:  Non-Smoker    Hematology/Oncology:     Oncology Normal    -- Anemia:   EENT/Dental:EENT/Dental Normal   Cardiovascular:  Cardiovascular Normal Exercise tolerance: good     Pulmonary:  Pulmonary Normal    Renal/:  Renal/ Normal     Hepatic/GI:  Hepatic/GI Normal    Musculoskeletal:  Musculoskeletal Normal    Neurological:  Neurology Normal    Endocrine:  Endocrine Normal    Psych:  Psychiatric Normal           Physical Exam  General:  Well nourished    Airway/Jaw/Neck:  Airway Findings: Mouth Opening: Normal Tongue: Normal  General Airway Assessment: Adult  Mallampati: II  TM Distance: Normal, at least 6 cm        Eyes/Ears/Nose:  EYES/EARS/NOSE FINDINGS: Normal   Dental:  Dental Findings: In tact   Chest/Lungs:  Chest/Lungs Findings: Normal Respiratory Rate     Heart/Vascular:  Heart Findings: Rate: Normal  Rhythm: Regular Rhythm        Mental Status:  Mental Status Findings:  Cooperative, Alert and Oriented         Anesthesia Plan  Type  of Anesthesia, risks & benefits discussed:  Anesthesia Type:  spinal, general, MAC, CSE, epidural  Patient's Preference:   Intra-op Monitoring Plan: standard ASA monitors  Intra-op Monitoring Plan Comments:   Post Op Pain Control Plan: multimodal analgesia  Post Op Pain Control Plan Comments:   Induction:   IV  Beta Blocker:  Patient is not currently on a Beta-Blocker (No further documentation required).       Informed Consent: Patient understands risks and agrees with Anesthesia plan.  Questions answered. Anesthesia consent signed with patient.  ASA Score: 2     Day of Surgery Review of History & Physical: I have interviewed and examined the patient. I have reviewed the patient's H&P dated:            Ready For Surgery From Anesthesia Perspective.

## 2019-12-13 NOTE — L&D DELIVERY NOTE
[facility]  Vaginal Delivery   Operative Note    SUMMARY     Vacuum assisted vaginal delivery of live infant, skin to skin was unable to be performed due to operative delivery with moderate meconium.  Infant delivered position VAL over intact perineum.  Nuchal cord: No.    Spontaneous delivery of placenta and IV pitocin given noting good uterine tone.  2nd degree laceration noted and repaired in normal fashion with 2-0 and 3-0 chromic.  Patient tolerated delivery well. Sponge needle and lap counted correctly x2.    Indications: Normal labor  Pregnancy complicated by:   Patient Active Problem List   Diagnosis    Encounter for supervision of normal first pregnancy in third trimester    Abdominal pain affecting pregnancy    Normal labor     Admitting GA: 39w1d    Delivery Information for Hilary Ayers    Birth information:  YOB: 2020   Time of birth: 1:13 PM   Sex: female   Head Delivery Date/Time: 12/13/2019  1:13 PM   Delivery type: Vaginal, Vacuum (Extractor)   Gestational Age: 39w1d    Delivery Providers    Delivering clinician:  Jessica Morejon MD   Provider Role    Yanira Toussaint RN Circulator    [facility] Surgical Tech    Hannah Saucedo RN Delivery Nurse    Kalyan Fay MD Pediatrician            Measurements    Weight:  3453 g  Length:           Apgars    Living status:  Living  Apgars:   1 min.:   5 min.:   10 min.:   15 min.:   20 min.:     Skin color:   1  1       Heart rate:   2  2       Reflex irritability:   2  2       Muscle tone:   2  2       Respiratory effort:   1  2       Total:   8  9       Apgars assigned by:  HANNAH SAUCEDO RN         Operative Delivery    Forceps attempted?:  No  Vacuum extractor attempted?:  Yes  Vacuum indications:  Fetal Heart Rate or Rhythm Abnormality  Vacuum type:  Kiwi  Vacuum application location:  Low  First attempt time vacuum applied:  12/13/2019 13:11:00  First attempt time vacuum removed:  12/13/2019  13:12:00  Number of pop offs:  0  Number of pulls with vacuum:  3  Vacuum applied by:  DR. BECK  Failed vacuum delivery?:  No               Presentation    Presentation:  Vertex  Position:  Right Occiput Anterior           Interventions/Resuscitation    Method:  Bulb Suctioning, Tactile Stimulation       Cord    Vessels:  3 vessels  Complications:  None  Cord Clamped Date/Time:  2019  1:13 PM  Cord Blood Disposition:  Lab  Gases Sent?:  No  Stem Cell Collection (by MD):  No       Placenta    Placenta delivery date/time:  2019 1315  Placenta removal:  Spontaneous  Placenta appearance:  Intact  Placenta disposition:  discarded           Labor Events:       labor: No     Labor Onset Date/Time:         Dilation Complete Date/Time:         Start Pushing Date/Time:       Rupture Date/Time:              Rupture type:           Fluid Amount:        Fluid Color:        Fluid Odor:        Membrane Status (PeriCalm): SRM (Spontaneous Rupture)      Rupture Date/Time (PeriCalm):        Fluid Amount (PeriCalm):        Fluid Color (PeriCalm):         steroids: None     Antibiotics given for GBS: No     Induction: oxytocin     Indications for induction:  Fetal Heart Rate or Rhythm Abnormality     Augmentation:       Indications for augmentation:       Labor complications: None     Additional complications:          Cervical ripening:                     Delivery:      Episiotomy:       Indication for Episiotomy:       Perineal Lacerations: 2nd Repaired:      Periurethral Laceration:   Repaired:     Labial Laceration:   Repaired:     Sulcus Laceration:   Repaired:     Vaginal Laceration:   Repaired:     Cervical Laceration:   Repaired:     Repair suture:       Repair # of packets:       Last Value - EBL - Nursing (mL):       Sum - EBL - Nursing (mL): 0     Last Value - EBL - Anesthesia (mL):      Calculated QBL (mL): 329      Vaginal Sweep Performed: Yes     Surgicount Correct: No       Other  providers:            Details (if applicable):  Trial of Labor      Categorization:      Priority:     Indications for :     Incision Type:       Additional  information:  Forceps:    Vacuum:    Breech:    Observed anomalies    Other (Comments):         Jessica Morejon MD, FACOG  OB/GYN  Pager: [phone]

## 2019-12-13 NOTE — ANESTHESIA PROCEDURE NOTES
CSE    Patient location during procedure: OB  Start time: 12/13/2019 8:46 AM  Timeout: 12/13/2019 8:45 AM  End time: 12/13/2019 8:55 AM    Staffing  Authorizing Provider: Stefania Akbar MD  Performing Provider: Sav Macedo MD    Preanesthetic Checklist  Completed: patient identified, site marked, surgical consent, pre-op evaluation, timeout performed, IV checked, risks and benefits discussed and monitors and equipment checked  CSE  Patient position: sitting  Prep: ChloraPrep  Patient monitoring: heart rate, cardiac monitor, continuous pulse ox and frequent blood pressure checks  Approach: midline  Spinal Needle  Needle type: pencil-tip   Needle gauge: 25 G  Needle length: 5 in  Epidural Needle  Injection technique: KAMRAN air  Needle type: Tuohy   Needle gauge: 17 G  Needle length: 3.5 in  Needle insertion depth: 6.5 cm  Location: L4-5  Needle localization: anatomical landmarks  Catheter  Catheter type: springwound  Catheter size: 19 G  Catheter at skin depth: 12 cm  Test dose: lidocaine 1.5% with Epi 1-to-200,000  Additional Documentation: incremental injection, negative aspiration for CSF, negative aspiration for heme and no paresthesia on injection  Assessment  Sensory level: T7   Dermatomal levels determined by pinch or prick  Additional Notes  1.6 ml 0.1%Ropivacaine/Fentanyl mix intrathecal

## 2019-12-13 NOTE — PROGRESS NOTES
25 y/o G1 at 39 weeks admitted for observaton earlier today, had some spotting, no active labor  Uncomplicated care with dr browne this pregnancy    Pt has reactive nst, did have a variable deceleration just after lunchtime and another about 4pm  Otherwise fhr has been cat 1, and reactive  cx by me 2cm, 50-60%, soft, slight bloody show  No active labor, irreg contractions    Discussed case with dr browne,   Will admit for overnight obs with pitocin augmentation in am  If any distress with proceed with delivery

## 2019-12-14 PROBLEM — R10.9 ABDOMINAL PAIN AFFECTING PREGNANCY: Status: RESOLVED | Noted: 2019-12-12 | Resolved: 2019-12-14

## 2019-12-14 PROBLEM — O26.899 ABDOMINAL PAIN AFFECTING PREGNANCY: Status: RESOLVED | Noted: 2019-12-12 | Resolved: 2019-12-14

## 2019-12-14 PROBLEM — Z37.9 NORMAL LABOR: Status: RESOLVED | Noted: 2019-12-13 | Resolved: 2019-12-14

## 2019-12-14 PROBLEM — Z34.03 ENCOUNTER FOR SUPERVISION OF NORMAL FIRST PREGNANCY IN THIRD TRIMESTER: Status: RESOLVED | Noted: 2019-10-07 | Resolved: 2019-12-14

## 2019-12-14 LAB
BASOPHILS # BLD AUTO: 0.02 K/UL (ref 0–0.2)
BASOPHILS NFR BLD: 0.2 % (ref 0–1.9)
DIFFERENTIAL METHOD: ABNORMAL
EOSINOPHIL # BLD AUTO: 0.2 K/UL (ref 0–0.5)
EOSINOPHIL NFR BLD: 1.6 % (ref 0–8)
ERYTHROCYTE [DISTWIDTH] IN BLOOD BY AUTOMATED COUNT: 14.5 % (ref 11.5–14.5)
HCT VFR BLD AUTO: 33.6 % (ref 37–48.5)
HGB BLD-MCNC: 11.3 G/DL (ref 12–16)
LYMPHOCYTES # BLD AUTO: 1.2 K/UL (ref 1–4.8)
LYMPHOCYTES NFR BLD: 12 % (ref 18–48)
MCH RBC QN AUTO: 31.4 PG (ref 27–31)
MCHC RBC AUTO-ENTMCNC: 33.6 G/DL (ref 32–36)
MCV RBC AUTO: 93 FL (ref 82–98)
MONOCYTES # BLD AUTO: 0.8 K/UL (ref 0.3–1)
MONOCYTES NFR BLD: 8.3 % (ref 4–15)
NEUTROPHILS # BLD AUTO: 7.8 K/UL (ref 1.8–7.7)
NEUTROPHILS NFR BLD: 77.9 % (ref 38–73)
PLATELET # BLD AUTO: 114 K/UL (ref 150–350)
PMV BLD AUTO: 11 FL (ref 9.2–12.9)
RBC # BLD AUTO: 3.6 M/UL (ref 4–5.4)
WBC # BLD AUTO: 10.05 K/UL (ref 3.9–12.7)

## 2019-12-14 PROCEDURE — 25000003 PHARM REV CODE 250: Performed by: OBSTETRICS & GYNECOLOGY

## 2019-12-14 PROCEDURE — 36415 COLL VENOUS BLD VENIPUNCTURE: CPT

## 2019-12-14 PROCEDURE — 99231 SBSQ HOSP IP/OBS SF/LOW 25: CPT | Mod: ,,, | Performed by: OBSTETRICS & GYNECOLOGY

## 2019-12-14 PROCEDURE — 11000001 HC ACUTE MED/SURG PRIVATE ROOM

## 2019-12-14 PROCEDURE — 99231 PR SUBSEQUENT HOSPITAL CARE,LEVL I: ICD-10-PCS | Mod: ,,, | Performed by: OBSTETRICS & GYNECOLOGY

## 2019-12-14 PROCEDURE — 85025 COMPLETE CBC W/AUTO DIFF WBC: CPT

## 2019-12-14 RX ADMIN — OXYCODONE AND ACETAMINOPHEN 1 TABLET: 5; 325 TABLET ORAL at 03:12

## 2019-12-14 RX ADMIN — IBUPROFEN 600 MG: 600 TABLET, FILM COATED ORAL at 03:12

## 2019-12-14 RX ADMIN — PRENATAL VIT W/ FE FUMARATE-FA TAB 27-0.8 MG 1 TABLET: 27-0.8 TAB at 08:12

## 2019-12-14 RX ADMIN — OXYCODONE AND ACETAMINOPHEN 1 TABLET: 5; 325 TABLET ORAL at 08:12

## 2019-12-14 NOTE — PLAN OF CARE
Mother will breastfeed on cue at least eight or more times in 24 hours when baby stable and able to go to breast. Will pump and provide EBM for supplementation until baby cleared to breastfeed. Will hand express after for additional stimulation. Will call with any breastfeeding needs.

## 2019-12-14 NOTE — ANESTHESIA POSTPROCEDURE EVALUATION
Anesthesia Post Evaluation    Patient: Jessie Ayers    Procedure(s) Performed: * No procedures listed *    Final Anesthesia Type: CSE    Patient location during evaluation: labor & delivery  Patient participation: Yes- Able to Participate  Level of consciousness: awake and alert and oriented  Post-procedure vital signs: reviewed and stable  Pain management: adequate  Airway patency: patent    PONV status at discharge: No PONV  Anesthetic complications: no      Cardiovascular status: blood pressure returned to baseline  Respiratory status: unassisted  Hydration status: euvolemic  Follow-up not needed.          Vitals Value Taken Time   /73 12/14/2019  8:00 AM   Temp 36.8 °C (98.3 °F) 12/14/2019  8:00 AM   Pulse 68 12/14/2019  8:00 AM   Resp 18 12/14/2019  8:00 AM   SpO2 99 % 12/14/2019  8:00 AM         No catheter in back  No headache/neckache/backache  Full return of neurological function  Able to urinate  Advised patient to report any new problems of back pain, especially with fever or decreasing bladder function occurring during coming days to weeks      Pain/Marisela Score: Pain Rating Prior to Med Admin: 4 (12/14/2019  8:49 AM)  Pain Rating Post Med Admin: 2 (12/14/2019  4:58 AM)

## 2019-12-14 NOTE — LACTATION NOTE
Baby admitted in NICU and not cleared to breastfeed at this time due to respiratory distress. [employer] Symphony pump, tubing, collections containers brought to bedside.  Discussed proper pump setting of initiation phase.  Instructed on proper usage of pump and to adjust suction according to maximum comfort level.  Verified appropriate flange fit.  Educated on the frequency and duration of pumping in order to promote and maintain a full milk supply.  Hands on pumping technique reviewed.  Encouraged hand expression after pumping.  Instructed on cleaning of breast pump parts.  Written instructions also given.  Pt verbalized understanding and appropriate recall for proper milk handling, collection, labeling. Informed pt's nurse An pt has been set up on breast pump and needs breast milk labels.       [facility]  Lactation Note - Mom    SUMMARY     Maternal Assessment    Breast Size Issue: none  Breast Shape: Bilateral:, round  Breast Density: Bilateral:, soft, other (see comments)(firm, dense)  Areola: Bilateral:, dense  Nipples: Bilateral:, everted, graspable  Preferred Pain Scale: number (Numeric Rating Pain Scale)  Comfort/Acceptable Pain Level: 10  Pain Body Location - Side: Bilateral  Pain Body Location - Orientation: lower  Pain Body Location: abdomen  Pain Rating (0-10): Rest: 0  Pain Rating (0-10): Activity: 0  Pain Rating: Rest: 2 - mild pain  Pain Rating: Activity: 0 - no pain  Frequency: intermittent  Quality: cramping  Pain Management Interventions: care clustered, pain management plan reviewed with patient/caregiver  Sleep/Rest/Relaxation: no problem identified, awake  POSS (Pasero Opioid-Induced Sed Scale): 1 - Awake and alert    LATCH Score         Breasts WDL    Breast WDL: WDL    Maternal Infant Feeding    Maternal Preparation: breast care, hand hygiene  Maternal Emotional State: assist needed, relaxed  Pain with Feeding: no(with pumping)  Milk Ejection Reflex: absent  Comfort  Measures Following Feeding: air-drying encouraged, expressed milk applied  Additional Documentation: Breastfeeding Supplementation (Group)    Lactation Referrals    Lactation Referrals: outpatient lactation program, other (see comments)(Medicaid to obtain breast pump)  Outpatient Lactation Program Lactation Follow-up Date/Time: call lact center PRN    Lactation Interventions    Breast Care: Breastfeeding: breast milk to nipples, manual expression to soften breast, milk massaged towards nipple, warm shower encouraged  Breastfeeding Assistance: electric breast pump used, support offered  Breast Care: Breastfeeding: breast milk to nipples, manual expression to soften breast, milk massaged towards nipple, warm shower encouraged  Breastfeeding Assistance: electric breast pump used, support offered  Breastfeeding Support: encouragement provided, maternal rest encouraged, maternal hydration promoted, lactation counseling provided       Breastfeeding Session    Breast Pumping Interventions: early pumping promoted, frequent pumping encouraged    Maternal Information    Date of Referral: 12/13/19  Person Making Referral: physician  Infant Reason for Referral: other (see comments)(admission to NICU for respiratory distress)

## 2019-12-14 NOTE — PLAN OF CARE
Language line used ID # [id number] Kalpesh. POC reviewed with patient. Pt verbalized understanding. VSS. NAD noted. Pt reports pain goal met with prescribed medications per MD.  Ambulating freely in room.  Tolerating regular diet. Bonding with baby. Smiles appropriately at baby. Family support noted at bedside.  Remains free from falls and injury. Will continue to monitor.

## 2019-12-14 NOTE — DISCHARGE INSTRUCTIONS
"Instrucciones Para Peterson de Kim    Instrucciones a Seguir    Dieta regular  Actividad: Aumentarntar gradualmente  Shalonda: Regadera o Noemi  Restricciones: No levantar nada pesado  Cuidado Personal: No tampones o duchas vaginales  Actividad Sexual: Julia relaciones sexuales  Planificacion Familiar: Consulta con serrano medico  Cuidado de los Senos: Use un sosten de soporte  Regresar al trabajo/escuela: Cuando serrano medico le indique    Cuando debe llamar al Doctor    *Fiebre de 100.4 o mas alto  *Nausea/Vomito persistente  *Secrecion de la incision  *Teri sangrado vaginal o coagulos  *Inflamacion/dolor en los brazos o las piernas  *Severo dolor de reza, vision borrosa or desmayos  *Frequencia/ardor urinario  *Senales de depresion post-parto        La Depresion Post-Parto    Usted acaba de tener un melissa'.  A pesar de que sabe que deberia sentirse emocionada y wilson, lo que seinte son ganas de llorar sin motivo y tiene dificultades para realizar ro tareas diarias.  Usted pasa la mayor parte del tiempo zack, cansada y desesperanzada, y hasta podria sentirse avergonzada o culpable.  Sunil lo que le esta sucediendo no es culpa suya, y puede sentirse mejor.  Hable con serrano medico para que la ayude.     La Depresion Despues del Parto    Arsh vez sienta cansancio y ganas de llorar tereza despues de peterson a renata.  Esta etapa melancolica, denominada "baby blues", puede hacerla sentir zack y desesperanzada, o temerosa de que algo evert le vaya a pasar al melissa.  Algunas mujeres hasta llegan a poner en nabil el que puedan ser buenas madres.    Que' es la Depresion?    La depresion es un trastorno del animo que afecta serrano manera de pernsar y de sentir.  El sintoma mas frecuente es un sentimiento de honda tristeza: tambien podria causane la sensacion de que ya no puede sobrellevar la karli.    Otros sintomas incluyen:      * Ganar o perder mucho peso  * Dormir en exceso o demasiado poco  * Estar cansada todo el tiempo  * Sentirse inquieta  * " Tener miedo de querer herir al melissa'  * No tener interes por el melissa'  * Sentirse inutil o culpable   * No encontrar placer en las cosas que solia gustarle hacer  * Dificultades para pensar con claridad o alyssa decisiones  * Pensar sobre la muerte o el suicidio     Que' Causa la Depresion Postparto?    La causea exacta de la depresion postarto se desconce, aunque posiblemente es el resultado de los cambios hormonales que suceden nayeli y despues del parto.  Tambien puede deberse al cansancio que le causan las exigencias del melissa' y el proceso de adaptacion a serrano maternidad.  Todos estos factores podrian deprimirla.  En algunos casos, existe lucia predisposicion genetica a july tipo de depresion.    La depresion puede tratarse    Lo adams es que hay muchas maneras de tratar la depresion postparo.  Emprenda el primer paso para sentirse mejor hablando con serrano medico.     Recursos    * National Institutes of Mental Health-- [phone]     www.nimh.nih.gov    * National [employer] on Mental Illness -- [phone]     Www.sevne.org    * Mental Health Jocelyn --  [phone]     Www.[facility].org    * National Suidide Hotline -- [phone]    3905-5330 [employer]  Todos los derechos reservados.  Esta informacion no pretende sustituir las atencion medica profesional.  Solo serrano medico puede diagnosticar y tratar un problema de fern.           Instrucciones de la lactancia maternal para los bebes recién nacidos:    La Academia [employer] Pediatra Americana recomienda la leche maternal rosaura la unica Nayla de nutrición para serrano bebé nayeli los primeros 6 meses de la karli, y puede continuar, con la introducción de comida, hasta y despues de 1 ano de edad. Informado sobre chapin consejos: Hay muchos beneficios de amamantar para el fern de la madre y del bebé. Julia los chupones, teteras, o botellas por lo menos por las primeras 4 semanas. Usar los chupones, teteras, o botellas pueden interumpir el proceso de amamantar.     Alimenta en cuanto hay muestras de hambre:  o Cleve en la boca, hacienda movimientos de succionar.  o Ruiditos suavecitos o estirarse  o Llorar es un signo de hambre tardío: no esperes a que el bebé llore.   Es de esperarse que haya 8-12 alimentaciones por 24 horas, aunque estas alimentaciones no sigan un horario definido.   Alterna el seno con el que empiezas, o empieza con el seno que se siente más lleno.   Cambia de lado cuando el bebé empiece a tragar más despacio o se desconecte del seno.   Esta nickie si el bebé no come del yon seno en cada alimentación.   Si el bebé esta muy dormido o somnoliente: el contacto de piel a piel puede animarlo a empezar a comer:  o Quítale la camiseta y acuéstalo sobre tu pecho desnudo   Duerme cerca de tu bebé, aun en la casa. Aprende a peterson pecho acostada.   En la posición correcta los dos estarán cómodos:  o barbilla con seno, pecho con pecho  o Labio del bebé abierto hacia afuera para tragar: el labio del bebé debe estar volteado hacia afuera  o Boca abierta nickie bassam: la boca del bebé cubre la mayoría de la areola (el área oscura del seno)--no nada más el pezón   Fíjate en los signos de que hay transferencia de leche:  o Puedes oír al bebé tragar.  o No se oyen ruidos más o menos kevin rosaura clic.  o El bebé no demuestra que tiene más hambre después de la alimentación.  o El cuerpo del bebé y ro cleve están relajados por un tiempo corto.   Lo que entra, tiene que salir. Está pendiente de:  o Al cuarto día, por lo menos 3 cacas al día.  o La chanda cambia de erich a lizeth o café y luego a amarillo más líquido cuando baja tu leche.  o Después del cuarto día, por lo menos 6 paòales mojados/pesados al día.  o La orina debe ser de color amarillo lady cuando baja tu leche.   Debes alyssa agua cuando tienes sed y comer alimentos sanos cuando tiene hambre. Jessie siesta para descansar lo suficiente.    No tomes medicamentos o alcohol sino con el consejo de serrano  doctor. Puedes llamar al [facility] ([phone]), Lunes a Viernes, 8am-5pm, para obtener información sobre los medicamentos y la leche maternal.   La queenie de seguimiento con la pediatra debe ser 2 días después de salir del hospital. El bebé deberia tristan ganado el peso de nacimiento cuando tiene 10-14 días de karli.

## 2019-12-14 NOTE — CONSULTS
Inpatient consult to Lactation  Consult performed by: Nicho Franco RN  Consult ordered by: Jessica Morejon MD  Reason for consult: G1 per Dr. Morejon   Assessment/Recommendations: Patient is being followed by lactation. See notes.

## 2019-12-14 NOTE — PROGRESS NOTES
[facility]  Obstetrics  Postpartum Progress Note    Patient Name: Jessie Ayers  MRN: [id number]  Admission Date: 2019  Hospital Length of Stay: 2 days  Attending Physician: Jessica Morejon MD  Primary Care Provider: Primary Doctor No    Subjective:     Principal Problem:Vacuum extractor delivery, delivered    Subjective  PPD 1, s/p VAVD.  Patient doing well.  Lochia minimal.  Pain controlled with medication.  Ambulating and voiding without difficulty.  Tolerating regular diet.  Breastfeeding.    Objective  Vitals:    19 0800   BP: 118/73   Pulse: 68   Resp: 18   Temp: 98.3 °F (36.8 °C)       Gen: NAD  CV: RRR  Lungs: CTAB  Abd: Soft, NT, ND.  Fundus firm, NT, below umbilicus  Ext: No calf tenderness  Psych: appropriate affect  Neuro: Grossly intact    Recent Labs   Lab 19  0602   WBC 10.05   RBC 3.60*   HGB 11.3*   HCT 33.6*   *   MCV 93   MCH 31.4*   MCHC 33.6       Assessment/Plan:     24 y.o. female  s/p VAVD, PPD 1, in stable condition.  - Continue routine postpartum care.    - Plan d/c home tomorrow if meeting all milestones.    Danielle Walden MD  Obstetrics  [facility]

## 2019-12-14 NOTE — PLAN OF CARE
Patient ambulating freely in room.  Lochia rubra &  light.  Fundus firm & at umbilicus. Use of dermoplast and krishna bottle reviewed with patient; reports relief of perineal pain with use. Tolerating regular diet.  Voiding and passing flatus.   POC reviewed with pt in Indonesian; able to verbalize acceptance and understanding.  Questions answered/encouraged; reassurance provided.  Pt reports moderate relief of perineal pain with prescribed medications per MD.  Family support noted at bedside. Call light in reach, side rails up x2, nonskid socks on, bed in lowest position with wheels locked.  Remains free from falls and/or injury. VSS. NAD noted.  WCTM.

## 2019-12-15 VITALS
SYSTOLIC BLOOD PRESSURE: 111 MMHG | DIASTOLIC BLOOD PRESSURE: 77 MMHG | HEART RATE: 63 BPM | HEIGHT: 62 IN | WEIGHT: 127 LBS | RESPIRATION RATE: 18 BRPM | OXYGEN SATURATION: 99 % | BODY MASS INDEX: 23.37 KG/M2 | TEMPERATURE: 98 F

## 2019-12-15 PROCEDURE — 25000003 PHARM REV CODE 250: Performed by: OBSTETRICS & GYNECOLOGY

## 2019-12-15 PROCEDURE — 99238 PR HOSPITAL DISCHARGE DAY,<30 MIN: ICD-10-PCS | Mod: ,,, | Performed by: OBSTETRICS & GYNECOLOGY

## 2019-12-15 PROCEDURE — 99238 HOSP IP/OBS DSCHRG MGMT 30/<: CPT | Mod: ,,, | Performed by: OBSTETRICS & GYNECOLOGY

## 2019-12-15 RX ADMIN — IBUPROFEN 600 MG: 600 TABLET, FILM COATED ORAL at 01:12

## 2019-12-15 RX ADMIN — OXYCODONE AND ACETAMINOPHEN 1 TABLET: 5; 325 TABLET ORAL at 01:12

## 2019-12-15 NOTE — PLAN OF CARE
Pt is tolerating a regular diet, voiding, passing gas.  Pain well managed with ordered pain medication.  NAD

## 2019-12-15 NOTE — DISCHARGE SUMMARY
[facility]  Obstetrics  Discharge Summary      Patient Name: Jessie Ayers  MRN: [id number]  Admission Date: 2019  Hospital Length of Stay: 3 days  Discharge Date and Time:  12/15/2019 6:36 AM  Attending Physician: Jessica Morejon MD   Discharging Provider: Danielle Walden MD   Primary Care Provider: Primary Doctor No      Hospital Course:   23yo  presented to L&D at 39w1d in active labor.   She had a vacuum-assisted vaginal delivery of a female infant on 19, for details please see delivery report.  Her postpartum course was uncomplicated, and by PPD2 she was meeting all milestones for discharge.  She was ambulating and voiding without difficulty, tolerating a regular diet, and pain controlled with medication.  She is in stable condition for discharge home with strict precautions.  She will follow up with Dr. Morejon for postpartum visit.         Consults (From admission, onward)        Status Ordering Provider     Inpatient consult to Lactation  Once     Provider:  (Not yet assigned)    JESSICA Duron          Final Active Diagnoses:    Diagnosis Date Noted POA    PRINCIPAL PROBLEM:  Vacuum extractor delivery, delivered [O66.5] 2019 Yes      Problems Resolved During this Admission:    Diagnosis Date Noted Date Resolved POA    Normal labor [O80, Z37.9] 2019 Not Applicable    Abdominal pain affecting pregnancy [O26.899, R10.9] 2019 Yes        Labs:   CBC   Recent Labs   Lab 19  0602   WBC 10.05   HGB 11.3*   HCT 33.6*   *       Feeding Method: breast    Immunizations     Date Immunization Status Dose Route/Site Given by    19 1300 MMR Deferred (Other) 0.5 mL Subcutaneous/Left deltoid Lucero Taveras RN    19 1700 Tdap Deleted 0.5 mL Intramuscular/Left deltoid           Delivery:    Episiotomy:     Lacerations: 2nd   Repair suture:     Repair # of packets:     Blood loss (ml):       Birth  information:  YOB: 2020   Time of birth: 1:13 PM   Sex: female   Delivery type: Vaginal, Vacuum (Extractor)   Gestational Age: 39w1d    Delivery Clinician:      Other providers:       Additional  information:  Forceps:    Vacuum:    Breech:    Observed anomalies      Living?:           APGARS  One minute Five minutes Ten minutes   Skin color:         Heart rate:         Grimace:         Muscle tone:         Breathing:         Totals: 8  9        Placenta: Delivered:       appearance    Pending Diagnostic Studies:     None          Discharged Condition: stable    Disposition: Home or Self Care    Follow Up:  Follow-up Information     Jessica Morejon MD In 4 weeks.    Specialties:  Obstetrics, Obstetrics and Gynecology  Why:  Postoperative visit  Contact information:  [address]  [address]  [place] [address]  [phone]                 Patient Instructions:      Diet Adult Regular     Other restrictions (specify):   Order Comments: Pelvic rest x 6 weeks. No heavy lifting. No baths (showers only).     Notify your health care provider if you experience any of the following:  temperature >100.4     Notify your health care provider if you experience any of the following:  persistent nausea and vomiting or diarrhea     Notify your health care provider if you experience any of the following:  severe uncontrolled pain     Notify your health care provider if you experience any of the following:  difficulty breathing or increased cough     Notify your health care provider if you experience any of the following:  severe persistent headache     Notify your health care provider if you experience any of the following:  persistent dizziness, light-headedness, or visual disturbances     Notify your health care provider if you experience any of the following:  increased confusion or weakness     Medications:  Current Discharge Medication List      START taking these medications    Details   ibuprofen  (ADVIL,MOTRIN) 600 MG tablet Take 1 tablet (600 mg total) by mouth every 6 (six) hours as needed.  Qty: 30 tablet, Refills: 0         CONTINUE these medications which have NOT CHANGED    Details   PNV,calcium 72-iron-folic acid (PRENATAL VITAMIN PLUS LOW IRON) 27 mg iron- 1 mg Tab Take 1 tablet (1 each total) by mouth once daily.  Qty: 30 tablet, Refills: 11    Associated Diagnoses: Encounter for supervision of normal first pregnancy in third trimester; 37 weeks gestation of pregnancy      levonorgestrel (MIRENA) 20 mcg/24 hours (5 yrs) 52 mg IUD 1 Intra Uterine Device by Intrauterine route once. RETURN FOR INTRAUTERINE INSERTION IN THE OFFICE for 1 dose  Qty: 1 each, Refills: 0      PRENATAL 28 mg iron- 800 mcg Tab TK 1 T PO QD  Refills: 4             Danielle Walden MD  Obstetrics  [facility]

## 2019-12-23 ENCOUNTER — POSTPARTUM VISIT (OUTPATIENT)
Dept: OBSTETRICS AND GYNECOLOGY | Facility: CLINIC | Age: 24
End: 2019-12-23
Payer: MEDICAID

## 2019-12-23 VITALS
WEIGHT: 113.13 LBS | SYSTOLIC BLOOD PRESSURE: 104 MMHG | BODY MASS INDEX: 20.69 KG/M2 | DIASTOLIC BLOOD PRESSURE: 78 MMHG

## 2019-12-23 PROCEDURE — 0503F POSTPARTUM CARE VISIT: CPT | Mod: ,,, | Performed by: OBSTETRICS & GYNECOLOGY

## 2019-12-23 PROCEDURE — 0503F PR POSTPARTUM CARE VISIT: ICD-10-PCS | Mod: ,,, | Performed by: OBSTETRICS & GYNECOLOGY

## 2019-12-23 PROCEDURE — 99999 PR PBB SHADOW E&M-EST. PATIENT-LVL II: ICD-10-PCS | Mod: PBBFAC,,, | Performed by: OBSTETRICS & GYNECOLOGY

## 2019-12-23 PROCEDURE — 99999 PR PBB SHADOW E&M-EST. PATIENT-LVL II: CPT | Mod: PBBFAC,,, | Performed by: OBSTETRICS & GYNECOLOGY

## 2019-12-23 PROCEDURE — 99212 OFFICE O/P EST SF 10 MIN: CPT | Mod: PBBFAC,PO | Performed by: OBSTETRICS & GYNECOLOGY

## 2019-12-23 RX ORDER — ACETAMINOPHEN AND CODEINE PHOSPHATE 120; 12 MG/5ML; MG/5ML
1 SOLUTION ORAL DAILY
Qty: 28 TABLET | Refills: 11 | Status: SHIPPED | OUTPATIENT
Start: 2019-12-23 | End: 2021-09-16

## 2019-12-23 NOTE — PROGRESS NOTES
CC: Post-partum follow-up    Jessie Ayers is a 24 y.o. female  who presents for post-partum visit.  Pregnancy was complicated by occasional mild range BP. She is S/P a Vacuum extraction.  She and the baby are doing well.  No pain.  No fever.  No bowel /breast/ bladder complaints. Hx of LSIL pap needs colpo    Delivery Date: 2019  Delivery MD: Darleen  Gender: female  Birth Weight: 7 pounds 9.8 oz ounces  Breast Feeding: YES  Depression: NO  - reports good support system,  Contraception: oral progesterone-only contraceptive    /78   Wt 51.3 kg (113 lb 1.5 oz)   LMP 2019 (Within Weeks)   Breastfeeding? Yes   BMI 20.69 kg/m²       ROS:  GENERAL: Denies fever or chills.   SKIN: Denies rash or lesions.   HEAD: Denies head injury or headache.   CHEST: Denies chest pain or shortness of breath.   CARDIOVASCULAR: Denies palpitations or chest pain.   ABDOMEN: No constipation, diarrhea, nausea, vomiting or rectal bleeding.   URINARY: see HPI  REPRODUCTIVE: See HPI.   BREASTS: see HPI  NEUROLOGIC: Denies syncope or weakness.     Physical Exam:  GENERAL: alert, appears stated age and cooperative  NEUROLOGIC: orientated to person, place and time, normal mood and affect   CHEST: Normal respiratory effort  NECK: normal appearance,  SKIN: no acne, striae, hirsutism  ABDOMEN: abdomen is soft without significant tenderness  EXTERNAL GENITALIA:  normal general appearance, stitches still present. Healing- no signs of infection  URETHRA: normal urethra, normal urethral meatus    ASSESSMENT/PLAN:  1. Postpartum state        Doing well S/P Vacuum extraction, discussed perineal care and need for close f/u with colpo once pregnancy cervical changes resolved. May need to go to [facility] if insurance is an issue  Instructions / precautions reviewed  Contraceptive counseling- rx pills sent. Declines IUD now      Orders Placed This Encounter    norethindrone (ORTHO MICRONOR) 0.35 mg tablet       Follow up in  about 4 weeks (around 1/20/2020) for postpartum visit, colposcopy.        Jessica Morejon MD  OB/GYN  Pager: [phone]

## 2020-01-28 ENCOUNTER — OFFICE VISIT (OUTPATIENT)
Dept: OBSTETRICS AND GYNECOLOGY | Facility: CLINIC | Age: 25
End: 2020-01-28
Payer: MEDICAID

## 2020-01-28 VITALS
WEIGHT: 109.81 LBS | BODY MASS INDEX: 20.08 KG/M2 | DIASTOLIC BLOOD PRESSURE: 64 MMHG | SYSTOLIC BLOOD PRESSURE: 110 MMHG

## 2020-01-28 DIAGNOSIS — R87.612 LOW GRADE SQUAMOUS INTRAEPITHELIAL LESION (LGSIL) ON CERVICAL PAP SMEAR: Primary | ICD-10-CM

## 2020-01-28 LAB
B-HCG UR QL: NEGATIVE
CTP QC/QA: YES

## 2020-01-28 PROCEDURE — 88305 TISSUE EXAM BY PATHOLOGIST: ICD-10-PCS | Mod: 26,,, | Performed by: PATHOLOGY

## 2020-01-28 PROCEDURE — 99999 PR PBB SHADOW E&M-EST. PATIENT-LVL II: CPT | Mod: PBBFAC,,, | Performed by: OBSTETRICS & GYNECOLOGY

## 2020-01-28 PROCEDURE — 99499 NO LOS: ICD-10-PCS | Mod: S$PBB,,, | Performed by: OBSTETRICS & GYNECOLOGY

## 2020-01-28 PROCEDURE — 99999 PR PBB SHADOW E&M-EST. PATIENT-LVL II: ICD-10-PCS | Mod: PBBFAC,,, | Performed by: OBSTETRICS & GYNECOLOGY

## 2020-01-28 PROCEDURE — 99499 UNLISTED E&M SERVICE: CPT | Mod: S$PBB,,, | Performed by: OBSTETRICS & GYNECOLOGY

## 2020-01-28 PROCEDURE — 88305 TISSUE EXAM BY PATHOLOGIST: CPT | Performed by: PATHOLOGY

## 2020-01-28 PROCEDURE — 81025 URINE PREGNANCY TEST: CPT | Mod: PBBFAC,PO | Performed by: OBSTETRICS & GYNECOLOGY

## 2020-01-28 PROCEDURE — 99212 OFFICE O/P EST SF 10 MIN: CPT | Mod: PBBFAC,PO | Performed by: OBSTETRICS & GYNECOLOGY

## 2020-01-28 PROCEDURE — 88305 TISSUE EXAM BY PATHOLOGIST: CPT | Mod: 26,,, | Performed by: PATHOLOGY

## 2020-01-28 NOTE — PROGRESS NOTES
COLPOSCOPY:    Jessie Ayers is a 24 y.o. female   presents for colposcopy.  Patient's last menstrual period was 2020 (exact date)..  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      The abnormal test findings were discussed, as well as HPV infection, need for colposcopy and possible biopsies to determine the plan of care, treatments available, the minimal risk of bleeding and infection with colposcopy, and alternatives to colposcopy and she agrees to proceed.      UPT is negative    COLPOSCOPY EXAM:   TIME OUT PERFORMED.     acetowhite lesion(s) noted at 2 o'clock    The speculum was placed and cervix adequately visualized. Acetic acid applied to the cervix and findings as listed above. Biopsy was taken at 2 and 5 o'clock.  ECC was performed. Hemostasis was adequate with application of Monsel's solution. The speculum was removed.The patient did tolerate the procedure well.    All collected specimens sent to pathology for histologic analysis.    Post-colposcopy counseling:  The patient was instructed to manage post-colposcopy cramping with NSAIDs or Tylenol, or with a prescription per the medication card.  Avoid intercourse, douching, or tampons in the vagina for at least 2-3 days.  Expect a clumpy blackish discharge due to Monsel's solution application for several days.  Report heavy bleeding, worsening pain or pain that does not respond to above medications, or foul-smelling vaginal discharge. HPV vaccine recommended according to FDA age guidelines.  Importance of follow-up stressed.      Follow up pending results    Jessica Morejon MD, FACOG  OB/GYN

## 2020-02-09 LAB
FINAL PATHOLOGIC DIAGNOSIS: NORMAL
GROSS: NORMAL

## 2020-02-10 ENCOUNTER — TELEPHONE (OUTPATIENT)
Dept: OBSTETRICS AND GYNECOLOGY | Facility: CLINIC | Age: 25
End: 2020-02-10

## 2020-02-10 NOTE — TELEPHONE ENCOUNTER
Called and informed patient of results. Patient agreed and verbalized understanding.    Notes recorded by Jessica Morejon MD on 2/10/2020 at 12:39 PM CST  CIN1 with neg ecc. Please inform pt we just need to repeat pap/hpv in 12 months

## 2021-08-16 ENCOUNTER — TELEPHONE (OUTPATIENT)
Dept: OBSTETRICS AND GYNECOLOGY | Facility: CLINIC | Age: 26
End: 2021-08-16

## 2021-08-17 ENCOUNTER — TELEPHONE (OUTPATIENT)
Dept: OBSTETRICS AND GYNECOLOGY | Facility: CLINIC | Age: 26
End: 2021-08-17

## 2021-08-19 ENCOUNTER — LAB VISIT (OUTPATIENT)
Dept: LAB | Facility: HOSPITAL | Age: 26
End: 2021-08-19
Attending: OBSTETRICS & GYNECOLOGY
Payer: MEDICAID

## 2021-08-19 ENCOUNTER — OFFICE VISIT (OUTPATIENT)
Dept: OBSTETRICS AND GYNECOLOGY | Facility: CLINIC | Age: 26
End: 2021-08-19
Payer: MEDICAID

## 2021-08-19 VITALS
SYSTOLIC BLOOD PRESSURE: 102 MMHG | BODY MASS INDEX: 21.41 KG/M2 | DIASTOLIC BLOOD PRESSURE: 66 MMHG | WEIGHT: 117.06 LBS

## 2021-08-19 DIAGNOSIS — N91.2 AMENORRHEA: ICD-10-CM

## 2021-08-19 DIAGNOSIS — Z11.3 SCREENING FOR STD (SEXUALLY TRANSMITTED DISEASE): Primary | ICD-10-CM

## 2021-08-19 DIAGNOSIS — Z12.4 CERVICAL CANCER SCREENING: ICD-10-CM

## 2021-08-19 LAB
ABO + RH BLD: NORMAL
ANION GAP SERPL CALC-SCNC: 12 MMOL/L (ref 8–16)
BASOPHILS # BLD AUTO: 0.04 K/UL (ref 0–0.2)
BASOPHILS NFR BLD: 0.5 % (ref 0–1.9)
BLD GP AB SCN CELLS X3 SERPL QL: NORMAL
BUN SERPL-MCNC: 6 MG/DL (ref 6–20)
CALCIUM SERPL-MCNC: 9.8 MG/DL (ref 8.7–10.5)
CHLORIDE SERPL-SCNC: 105 MMOL/L (ref 95–110)
CO2 SERPL-SCNC: 20 MMOL/L (ref 23–29)
CREAT SERPL-MCNC: 0.6 MG/DL (ref 0.5–1.4)
DIFFERENTIAL METHOD: ABNORMAL
EOSINOPHIL # BLD AUTO: 0.2 K/UL (ref 0–0.5)
EOSINOPHIL NFR BLD: 2.9 % (ref 0–8)
ERYTHROCYTE [DISTWIDTH] IN BLOOD BY AUTOMATED COUNT: 12.5 % (ref 11.5–14.5)
EST. GFR  (AFRICAN AMERICAN): >60 ML/MIN/1.73 M^2
EST. GFR  (NON AFRICAN AMERICAN): >60 ML/MIN/1.73 M^2
ESTIMATED AVG GLUCOSE: 94 MG/DL (ref 68–131)
GLUCOSE SERPL-MCNC: 75 MG/DL (ref 70–110)
HBA1C MFR BLD: 4.9 % (ref 4–5.6)
HCT VFR BLD AUTO: 39.4 % (ref 37–48.5)
HGB BLD-MCNC: 13.8 G/DL (ref 12–16)
IMM GRANULOCYTES # BLD AUTO: 0.02 K/UL (ref 0–0.04)
IMM GRANULOCYTES NFR BLD AUTO: 0.2 % (ref 0–0.5)
LYMPHOCYTES # BLD AUTO: 1.8 K/UL (ref 1–4.8)
LYMPHOCYTES NFR BLD: 21.2 % (ref 18–48)
MCH RBC QN AUTO: 31.9 PG (ref 27–31)
MCHC RBC AUTO-ENTMCNC: 35 G/DL (ref 32–36)
MCV RBC AUTO: 91 FL (ref 82–98)
MONOCYTES # BLD AUTO: 0.5 K/UL (ref 0.3–1)
MONOCYTES NFR BLD: 5.9 % (ref 4–15)
NEUTROPHILS # BLD AUTO: 5.7 K/UL (ref 1.8–7.7)
NEUTROPHILS NFR BLD: 69.3 % (ref 38–73)
NRBC BLD-RTO: 0 /100 WBC
PLATELET # BLD AUTO: 199 K/UL (ref 150–450)
PMV BLD AUTO: 10.6 FL (ref 9.2–12.9)
POTASSIUM SERPL-SCNC: 3.5 MMOL/L (ref 3.5–5.1)
RBC # BLD AUTO: 4.32 M/UL (ref 4–5.4)
SODIUM SERPL-SCNC: 137 MMOL/L (ref 136–145)
WBC # BLD AUTO: 8.25 K/UL (ref 3.9–12.7)

## 2021-08-19 PROCEDURE — 85025 COMPLETE CBC W/AUTO DIFF WBC: CPT | Performed by: OBSTETRICS & GYNECOLOGY

## 2021-08-19 PROCEDURE — 83021 HEMOGLOBIN CHROMOTOGRAPHY: CPT | Performed by: OBSTETRICS & GYNECOLOGY

## 2021-08-19 PROCEDURE — 87481 CANDIDA DNA AMP PROBE: CPT | Mod: 59 | Performed by: OBSTETRICS & GYNECOLOGY

## 2021-08-19 PROCEDURE — 99999 PR PBB SHADOW E&M-EST. PATIENT-LVL III: ICD-10-PCS | Mod: PBBFAC,,, | Performed by: OBSTETRICS & GYNECOLOGY

## 2021-08-19 PROCEDURE — 88175 CYTOPATH C/V AUTO FLUID REDO: CPT | Performed by: OBSTETRICS & GYNECOLOGY

## 2021-08-19 PROCEDURE — 86900 BLOOD TYPING SEROLOGIC ABO: CPT | Performed by: OBSTETRICS & GYNECOLOGY

## 2021-08-19 PROCEDURE — 81220 CFTR GENE COM VARIANTS: CPT | Performed by: OBSTETRICS & GYNECOLOGY

## 2021-08-19 PROCEDURE — 99203 PR OFFICE/OUTPT VISIT, NEW, LEVL III, 30-44 MIN: ICD-10-PCS | Mod: S$PBB,TH,, | Performed by: OBSTETRICS & GYNECOLOGY

## 2021-08-19 PROCEDURE — 87340 HEPATITIS B SURFACE AG IA: CPT | Performed by: OBSTETRICS & GYNECOLOGY

## 2021-08-19 PROCEDURE — 99203 OFFICE O/P NEW LOW 30 MIN: CPT | Mod: S$PBB,TH,, | Performed by: OBSTETRICS & GYNECOLOGY

## 2021-08-19 PROCEDURE — 36415 COLL VENOUS BLD VENIPUNCTURE: CPT | Performed by: OBSTETRICS & GYNECOLOGY

## 2021-08-19 PROCEDURE — 87088 URINE BACTERIA CULTURE: CPT | Performed by: OBSTETRICS & GYNECOLOGY

## 2021-08-19 PROCEDURE — 87491 CHLMYD TRACH DNA AMP PROBE: CPT | Mod: 59 | Performed by: OBSTETRICS & GYNECOLOGY

## 2021-08-19 PROCEDURE — 83036 HEMOGLOBIN GLYCOSYLATED A1C: CPT | Performed by: OBSTETRICS & GYNECOLOGY

## 2021-08-19 PROCEDURE — 99999 PR PBB SHADOW E&M-EST. PATIENT-LVL III: CPT | Mod: PBBFAC,,, | Performed by: OBSTETRICS & GYNECOLOGY

## 2021-08-19 PROCEDURE — 80048 BASIC METABOLIC PNL TOTAL CA: CPT | Performed by: OBSTETRICS & GYNECOLOGY

## 2021-08-19 PROCEDURE — 86762 RUBELLA ANTIBODY: CPT | Performed by: OBSTETRICS & GYNECOLOGY

## 2021-08-19 PROCEDURE — 99213 OFFICE O/P EST LOW 20 MIN: CPT | Mod: PBBFAC,PO | Performed by: OBSTETRICS & GYNECOLOGY

## 2021-08-19 PROCEDURE — 87147 CULTURE TYPE IMMUNOLOGIC: CPT | Performed by: OBSTETRICS & GYNECOLOGY

## 2021-08-19 PROCEDURE — 87591 N.GONORRHOEAE DNA AMP PROB: CPT | Mod: 59 | Performed by: OBSTETRICS & GYNECOLOGY

## 2021-08-19 PROCEDURE — 87086 URINE CULTURE/COLONY COUNT: CPT | Performed by: OBSTETRICS & GYNECOLOGY

## 2021-08-19 PROCEDURE — 87389 HIV-1 AG W/HIV-1&-2 AB AG IA: CPT | Performed by: OBSTETRICS & GYNECOLOGY

## 2021-08-19 PROCEDURE — 86592 SYPHILIS TEST NON-TREP QUAL: CPT | Performed by: OBSTETRICS & GYNECOLOGY

## 2021-08-19 RX ORDER — SWAB
SWAB, NON-MEDICATED MISCELLANEOUS
COMMUNITY

## 2021-08-20 LAB
HBV SURFACE AG SERPL QL IA: NEGATIVE
HIV 1+2 AB+HIV1 P24 AG SERPL QL IA: NEGATIVE
RPR SER QL: NORMAL
RUBV IGG SER-ACNC: 23.3 IU/ML
RUBV IGG SER-IMP: REACTIVE

## 2021-08-21 LAB
BACTERIA UR CULT: ABNORMAL
C TRACH DNA SPEC QL NAA+PROBE: NOT DETECTED
N GONORRHOEA DNA SPEC QL NAA+PROBE: NOT DETECTED

## 2021-08-22 ENCOUNTER — TELEPHONE (OUTPATIENT)
Dept: OBSTETRICS AND GYNECOLOGY | Facility: HOSPITAL | Age: 26
End: 2021-08-22

## 2021-08-22 RX ORDER — CEPHALEXIN 250 MG/1
250 CAPSULE ORAL 4 TIMES DAILY
Qty: 28 CAPSULE | Refills: 0 | Status: SHIPPED | OUTPATIENT
Start: 2021-08-22 | End: 2021-08-29

## 2021-08-23 LAB
BACTERIAL VAGINOSIS DNA: NEGATIVE
CANDIDA GLABRATA DNA: NEGATIVE
CANDIDA KRUSEI DNA: NEGATIVE
CANDIDA RRNA VAG QL PROBE: NEGATIVE
CFTR MUT ANL BLD/T: NORMAL
T VAGINALIS RRNA GENITAL QL PROBE: NEGATIVE

## 2021-08-24 LAB
HGB A2 MFR BLD HPLC: 3 % (ref 2.2–3.2)
HGB FRACT BLD ELPH-IMP: NORMAL
HGB FRACT BLD ELPH-IMP: NORMAL

## 2021-08-25 LAB
CLINICAL INFO: NORMAL
CYTO CVX: NORMAL
CYTOLOGIST CVX/VAG CYTO: NORMAL
CYTOLOGIST CVX/VAG CYTO: NORMAL
CYTOLOGY CMNT CVX/VAG CYTO-IMP: NORMAL
CYTOLOGY PAP THIN PREP EXPLANATION: NORMAL
DATE OF PREVIOUS PAP: NO
DATE PREVIOUS BX: NO
GEN CATEG CVX/VAG CYTO-IMP: NORMAL
LMP START DATE: NORMAL
MICROORGANISM CVX/VAG CYTO: NORMAL
PATHOLOGIST CVX/VAG CYTO: NORMAL
SERVICE CMNT-IMP: NORMAL
SPECIMEN SOURCE CVX/VAG CYTO: NORMAL
STAT OF ADQ CVX/VAG CYTO-IMP: NORMAL

## 2021-08-26 ENCOUNTER — TELEPHONE (OUTPATIENT)
Dept: OBSTETRICS AND GYNECOLOGY | Facility: CLINIC | Age: 26
End: 2021-08-26
Payer: MEDICAID

## 2021-09-16 ENCOUNTER — ROUTINE PRENATAL (OUTPATIENT)
Dept: OBSTETRICS AND GYNECOLOGY | Facility: CLINIC | Age: 26
End: 2021-09-16
Payer: MEDICAID

## 2021-09-16 VITALS — DIASTOLIC BLOOD PRESSURE: 70 MMHG | SYSTOLIC BLOOD PRESSURE: 100 MMHG | WEIGHT: 117.5 LBS | BODY MASS INDEX: 21.49 KG/M2

## 2021-09-16 DIAGNOSIS — Z3A.13 13 WEEKS GESTATION OF PREGNANCY: Primary | ICD-10-CM

## 2021-09-16 DIAGNOSIS — Z34.82 ENCOUNTER FOR SUPERVISION OF OTHER NORMAL PREGNANCY IN SECOND TRIMESTER: ICD-10-CM

## 2021-09-16 PROCEDURE — 99999 PR PBB SHADOW E&M-EST. PATIENT-LVL II: CPT | Mod: PBBFAC,,, | Performed by: OBSTETRICS & GYNECOLOGY

## 2021-09-16 PROCEDURE — 99212 OFFICE O/P EST SF 10 MIN: CPT | Mod: PBBFAC,TH,PO | Performed by: OBSTETRICS & GYNECOLOGY

## 2021-09-16 PROCEDURE — 99999 PR PBB SHADOW E&M-EST. PATIENT-LVL II: ICD-10-PCS | Mod: PBBFAC,,, | Performed by: OBSTETRICS & GYNECOLOGY

## 2021-09-16 PROCEDURE — 99213 PR OFFICE/OUTPT VISIT, EST, LEVL III, 20-29 MIN: ICD-10-PCS | Mod: S$PBB,TH,, | Performed by: OBSTETRICS & GYNECOLOGY

## 2021-09-16 PROCEDURE — 99213 OFFICE O/P EST LOW 20 MIN: CPT | Mod: S$PBB,TH,, | Performed by: OBSTETRICS & GYNECOLOGY

## 2021-09-23 ENCOUNTER — PROCEDURE VISIT (OUTPATIENT)
Dept: OBSTETRICS AND GYNECOLOGY | Facility: CLINIC | Age: 26
End: 2021-09-23
Payer: MEDICAID

## 2021-09-23 DIAGNOSIS — N91.2 AMENORRHEA: ICD-10-CM

## 2021-09-23 PROCEDURE — 76815 OB US LIMITED FETUS(S): CPT | Mod: 26,S$PBB,, | Performed by: OBSTETRICS & GYNECOLOGY

## 2021-09-23 PROCEDURE — 76815 PR  US,PREGNANT UTERUS,LIMITED, 1/> FETUSES: ICD-10-PCS | Mod: 26,S$PBB,, | Performed by: OBSTETRICS & GYNECOLOGY

## 2021-09-23 PROCEDURE — 76815 OB US LIMITED FETUS(S): CPT | Mod: PBBFAC,PO | Performed by: OBSTETRICS & GYNECOLOGY

## 2021-09-27 ENCOUNTER — TELEPHONE (OUTPATIENT)
Dept: OBSTETRICS AND GYNECOLOGY | Facility: CLINIC | Age: 26
End: 2021-09-27

## 2021-09-27 DIAGNOSIS — Z3A.20 20 WEEKS GESTATION OF PREGNANCY: Primary | ICD-10-CM

## 2021-10-18 ENCOUNTER — PROCEDURE VISIT (OUTPATIENT)
Dept: OBSTETRICS AND GYNECOLOGY | Facility: CLINIC | Age: 26
End: 2021-10-18
Payer: MEDICAID

## 2021-10-18 DIAGNOSIS — Z3A.20 20 WEEKS GESTATION OF PREGNANCY: ICD-10-CM

## 2021-10-18 DIAGNOSIS — Z36.89 SCREENING, ANTENATAL, FOR FETAL ANATOMIC SURVEY: ICD-10-CM

## 2021-10-18 PROCEDURE — 76805 PR US, OB 14+WKS, TRANSABD, SINGLE GESTATION: ICD-10-PCS | Mod: 26,S$PBB,, | Performed by: OBSTETRICS & GYNECOLOGY

## 2021-10-18 PROCEDURE — 76805 OB US >/= 14 WKS SNGL FETUS: CPT | Mod: 26,S$PBB,, | Performed by: OBSTETRICS & GYNECOLOGY

## 2021-10-18 PROCEDURE — 76805 OB US >/= 14 WKS SNGL FETUS: CPT | Mod: PBBFAC,PO | Performed by: OBSTETRICS & GYNECOLOGY

## 2021-10-20 ENCOUNTER — ROUTINE PRENATAL (OUTPATIENT)
Dept: OBSTETRICS AND GYNECOLOGY | Facility: CLINIC | Age: 26
End: 2021-10-20
Payer: MEDICAID

## 2021-10-20 VITALS
BODY MASS INDEX: 22.14 KG/M2 | DIASTOLIC BLOOD PRESSURE: 60 MMHG | WEIGHT: 121.06 LBS | SYSTOLIC BLOOD PRESSURE: 102 MMHG

## 2021-10-20 DIAGNOSIS — Z34.82 ENCOUNTER FOR SUPERVISION OF OTHER NORMAL PREGNANCY IN SECOND TRIMESTER: ICD-10-CM

## 2021-10-20 DIAGNOSIS — Z3A.22 22 WEEKS GESTATION OF PREGNANCY: Primary | ICD-10-CM

## 2021-10-20 PROCEDURE — 99213 PR OFFICE/OUTPT VISIT, EST, LEVL III, 20-29 MIN: ICD-10-PCS | Mod: S$PBB,TH,, | Performed by: OBSTETRICS & GYNECOLOGY

## 2021-10-20 PROCEDURE — 99213 OFFICE O/P EST LOW 20 MIN: CPT | Mod: S$PBB,TH,, | Performed by: OBSTETRICS & GYNECOLOGY

## 2021-11-17 ENCOUNTER — ROUTINE PRENATAL (OUTPATIENT)
Dept: OBSTETRICS AND GYNECOLOGY | Facility: CLINIC | Age: 26
End: 2021-11-17
Payer: MEDICAID

## 2021-11-17 VITALS
SYSTOLIC BLOOD PRESSURE: 100 MMHG | DIASTOLIC BLOOD PRESSURE: 42 MMHG | WEIGHT: 124.31 LBS | BODY MASS INDEX: 22.74 KG/M2

## 2021-11-17 DIAGNOSIS — Z34.82 ENCOUNTER FOR SUPERVISION OF OTHER NORMAL PREGNANCY IN SECOND TRIMESTER: ICD-10-CM

## 2021-11-17 DIAGNOSIS — Z3A.25 25 WEEKS GESTATION OF PREGNANCY: Primary | ICD-10-CM

## 2021-11-17 PROCEDURE — 99213 PR OFFICE/OUTPT VISIT, EST, LEVL III, 20-29 MIN: ICD-10-PCS | Mod: S$PBB,TH,, | Performed by: OBSTETRICS & GYNECOLOGY

## 2021-11-17 PROCEDURE — 99212 OFFICE O/P EST SF 10 MIN: CPT | Mod: PBBFAC,TH,PO | Performed by: OBSTETRICS & GYNECOLOGY

## 2021-11-17 PROCEDURE — 99213 OFFICE O/P EST LOW 20 MIN: CPT | Mod: S$PBB,TH,, | Performed by: OBSTETRICS & GYNECOLOGY

## 2021-11-17 PROCEDURE — 99999 PR PBB SHADOW E&M-EST. PATIENT-LVL II: ICD-10-PCS | Mod: PBBFAC,,, | Performed by: OBSTETRICS & GYNECOLOGY

## 2021-11-17 PROCEDURE — 99999 PR PBB SHADOW E&M-EST. PATIENT-LVL II: CPT | Mod: PBBFAC,,, | Performed by: OBSTETRICS & GYNECOLOGY

## 2021-12-15 ENCOUNTER — LAB VISIT (OUTPATIENT)
Dept: LAB | Facility: HOSPITAL | Age: 26
End: 2021-12-15
Attending: OBSTETRICS & GYNECOLOGY
Payer: MEDICAID

## 2021-12-15 ENCOUNTER — CLINICAL SUPPORT (OUTPATIENT)
Dept: OBSTETRICS AND GYNECOLOGY | Facility: CLINIC | Age: 26
End: 2021-12-15
Payer: MEDICAID

## 2021-12-15 ENCOUNTER — ROUTINE PRENATAL (OUTPATIENT)
Dept: OBSTETRICS AND GYNECOLOGY | Facility: CLINIC | Age: 26
End: 2021-12-15
Payer: MEDICAID

## 2021-12-15 ENCOUNTER — TELEPHONE (OUTPATIENT)
Dept: OBSTETRICS AND GYNECOLOGY | Facility: CLINIC | Age: 26
End: 2021-12-15
Payer: MEDICAID

## 2021-12-15 VITALS
BODY MASS INDEX: 23.47 KG/M2 | SYSTOLIC BLOOD PRESSURE: 115 MMHG | WEIGHT: 128.31 LBS | DIASTOLIC BLOOD PRESSURE: 75 MMHG

## 2021-12-15 DIAGNOSIS — Z3A.28 28 WEEKS GESTATION OF PREGNANCY: Primary | ICD-10-CM

## 2021-12-15 DIAGNOSIS — Z3A.28 28 WEEKS GESTATION OF PREGNANCY: ICD-10-CM

## 2021-12-15 DIAGNOSIS — Z34.83 ENCOUNTER FOR SUPERVISION OF OTHER NORMAL PREGNANCY IN THIRD TRIMESTER: ICD-10-CM

## 2021-12-15 DIAGNOSIS — Z23 NEED FOR TETANUS, DIPHTHERIA, AND ACELLULAR PERTUSSIS (TDAP) VACCINE: Primary | ICD-10-CM

## 2021-12-15 DIAGNOSIS — Z3A.29 29 WEEKS GESTATION OF PREGNANCY: Primary | ICD-10-CM

## 2021-12-15 LAB — GLUCOSE SERPL-MCNC: 113 MG/DL (ref 70–140)

## 2021-12-15 PROCEDURE — 99213 PR OFFICE/OUTPT VISIT, EST, LEVL III, 20-29 MIN: ICD-10-PCS | Mod: S$PBB,TH,, | Performed by: OBSTETRICS & GYNECOLOGY

## 2021-12-15 PROCEDURE — 36415 COLL VENOUS BLD VENIPUNCTURE: CPT | Performed by: OBSTETRICS & GYNECOLOGY

## 2021-12-15 PROCEDURE — 99999 PR PBB SHADOW E&M-EST. PATIENT-LVL I: ICD-10-PCS | Mod: PBBFAC,,, | Performed by: OBSTETRICS & GYNECOLOGY

## 2021-12-15 PROCEDURE — 82950 GLUCOSE TEST: CPT | Performed by: OBSTETRICS & GYNECOLOGY

## 2021-12-15 PROCEDURE — 99211 OFF/OP EST MAY X REQ PHY/QHP: CPT | Mod: PBBFAC,TH,PO | Performed by: OBSTETRICS & GYNECOLOGY

## 2021-12-15 PROCEDURE — 99999 PR PBB SHADOW E&M-EST. PATIENT-LVL I: CPT | Mod: PBBFAC,,, | Performed by: OBSTETRICS & GYNECOLOGY

## 2021-12-15 PROCEDURE — 99213 OFFICE O/P EST LOW 20 MIN: CPT | Mod: S$PBB,TH,, | Performed by: OBSTETRICS & GYNECOLOGY

## 2021-12-15 PROCEDURE — 90471 IMMUNIZATION ADMIN: CPT | Mod: PBBFAC,PO

## 2022-01-05 ENCOUNTER — ROUTINE PRENATAL (OUTPATIENT)
Dept: OBSTETRICS AND GYNECOLOGY | Facility: CLINIC | Age: 27
End: 2022-01-05
Payer: MEDICAID

## 2022-01-05 VITALS — SYSTOLIC BLOOD PRESSURE: 100 MMHG | WEIGHT: 127 LBS | DIASTOLIC BLOOD PRESSURE: 70 MMHG | BODY MASS INDEX: 23.23 KG/M2

## 2022-01-05 DIAGNOSIS — Z3A.32 32 WEEKS GESTATION OF PREGNANCY: Primary | ICD-10-CM

## 2022-01-05 DIAGNOSIS — Z34.83 ENCOUNTER FOR SUPERVISION OF OTHER NORMAL PREGNANCY IN THIRD TRIMESTER: ICD-10-CM

## 2022-01-05 PROCEDURE — 99213 OFFICE O/P EST LOW 20 MIN: CPT | Mod: S$PBB,TH,, | Performed by: OBSTETRICS & GYNECOLOGY

## 2022-01-05 PROCEDURE — 99999 PR PBB SHADOW E&M-EST. PATIENT-LVL II: CPT | Mod: PBBFAC,,, | Performed by: OBSTETRICS & GYNECOLOGY

## 2022-01-05 PROCEDURE — 99212 OFFICE O/P EST SF 10 MIN: CPT | Mod: PBBFAC,TH,PO | Performed by: OBSTETRICS & GYNECOLOGY

## 2022-01-05 PROCEDURE — 99213 PR OFFICE/OUTPT VISIT, EST, LEVL III, 20-29 MIN: ICD-10-PCS | Mod: S$PBB,TH,, | Performed by: OBSTETRICS & GYNECOLOGY

## 2022-01-05 PROCEDURE — 99999 PR PBB SHADOW E&M-EST. PATIENT-LVL II: ICD-10-PCS | Mod: PBBFAC,,, | Performed by: OBSTETRICS & GYNECOLOGY

## 2022-01-06 ENCOUNTER — TELEPHONE (OUTPATIENT)
Dept: OBSTETRICS AND GYNECOLOGY | Facility: CLINIC | Age: 27
End: 2022-01-06
Payer: MEDICAID

## 2022-01-06 DIAGNOSIS — Z3A.35 35 WEEKS GESTATION OF PREGNANCY: Primary | ICD-10-CM

## 2022-01-06 NOTE — PROGRESS NOTES
No complaints today   No nausea or vomiting   Denies vaginal bleeding or cramping     /70   Wt 57.6 kg (126 lb 15.8 oz)   LMP 06/14/2021 (Exact Date)   BMI 23.23 kg/m²     Prenatal labs reviewed  Aneuploidy screen  : offered  / declined ( - )    Anatomy US at 20 weeks   Consents=signed   Diabetes screen=neg   TdaP=given     GBBS @34-35W=   Growth US @ 36W  BC=       General Pregnancy  recommendations given  PNV + H2O intake    FU in 2 (-)  4  (x)  weeks     Discussed nonpharmacological pain relief methods for labor, techniques and benefits of effective breastfeeding position and latch, and basic breastfeeding management. Encouraged patient to attend [facility] and to download [facility]  mobile cookie if she has not already done so. Patient verbalizes understanding.     I spent a total of 30 minutes on the day of the visit.This includes face to face time and non-face to face time preparing to see the patient (eg, review of tests), Obtaining and/or reviewing separately obtained history, Documenting clinical information in the electronic or other health record, Independently interpreting resultsand communicating results to the patient/family/caregiver, or Care coordination.          Elias Aguila M.D.   OB/GYN

## 2022-01-18 ENCOUNTER — TELEPHONE (OUTPATIENT)
Dept: OBSTETRICS AND GYNECOLOGY | Facility: CLINIC | Age: 27
End: 2022-01-18
Payer: MEDICAID

## 2022-01-19 ENCOUNTER — ROUTINE PRENATAL (OUTPATIENT)
Dept: OBSTETRICS AND GYNECOLOGY | Facility: CLINIC | Age: 27
End: 2022-01-19
Payer: MEDICAID

## 2022-01-19 VITALS
WEIGHT: 129.44 LBS | SYSTOLIC BLOOD PRESSURE: 110 MMHG | BODY MASS INDEX: 23.67 KG/M2 | DIASTOLIC BLOOD PRESSURE: 70 MMHG

## 2022-01-19 DIAGNOSIS — Z3A.35 35 WEEKS GESTATION OF PREGNANCY: Primary | ICD-10-CM

## 2022-01-19 DIAGNOSIS — Z34.83 ENCOUNTER FOR SUPERVISION OF OTHER NORMAL PREGNANCY IN THIRD TRIMESTER: ICD-10-CM

## 2022-01-19 PROCEDURE — 99999 PR PBB SHADOW E&M-EST. PATIENT-LVL II: ICD-10-PCS | Mod: PBBFAC,,, | Performed by: OBSTETRICS & GYNECOLOGY

## 2022-01-19 PROCEDURE — 99212 OFFICE O/P EST SF 10 MIN: CPT | Mod: PBBFAC,TH,PO | Performed by: OBSTETRICS & GYNECOLOGY

## 2022-01-19 PROCEDURE — 99213 OFFICE O/P EST LOW 20 MIN: CPT | Mod: S$PBB,TH,, | Performed by: OBSTETRICS & GYNECOLOGY

## 2022-01-19 PROCEDURE — 99213 PR OFFICE/OUTPT VISIT, EST, LEVL III, 20-29 MIN: ICD-10-PCS | Mod: S$PBB,TH,, | Performed by: OBSTETRICS & GYNECOLOGY

## 2022-01-19 PROCEDURE — 99999 PR PBB SHADOW E&M-EST. PATIENT-LVL II: CPT | Mod: PBBFAC,,, | Performed by: OBSTETRICS & GYNECOLOGY

## 2022-01-19 NOTE — PROGRESS NOTES
No complaints today   No nausea or vomiting   Denies vaginal bleeding or cramping     /70   Wt 58.7 kg (129 lb 6.6 oz)   LMP 06/14/2021 (Exact Date)   BMI 23.67 kg/m²     Prenatal labs reviewed  Aneuploidy screen  : offered  / declined ( - )    Anatomy US at 20 weeks   Consents=signed   Diabetes screen=neg   TdaP=given     GBBS @34-35W=   Growth US @ 36W  BC=       General Pregnancy  recommendations given  PNV + H2O intake    FU in 1w   Discussed nonpharmacological pain relief methods for labor, techniques and benefits of effective breastfeeding position and latch, and basic breastfeeding management. Encouraged patient to attend [facility] and to download [facility]  mobile cookie if she has not already done so. Patient verbalizes understanding.     I spent a total of 30 minutes on the day of the visit.This includes face to face time and non-face to face time preparing to see the patient (eg, review of tests), Obtaining and/or reviewing separately obtained history, Documenting clinical information in the electronic or other health record, Independently interpreting resultsand communicating results to the patient/family/caregiver, or Care coordination.          Elias Aguila M.D.   OB/GYN

## 2022-01-26 ENCOUNTER — LAB VISIT (OUTPATIENT)
Dept: LAB | Facility: HOSPITAL | Age: 27
End: 2022-01-26
Attending: OBSTETRICS & GYNECOLOGY
Payer: MEDICAID

## 2022-01-26 ENCOUNTER — ROUTINE PRENATAL (OUTPATIENT)
Dept: OBSTETRICS AND GYNECOLOGY | Facility: CLINIC | Age: 27
End: 2022-01-26
Payer: MEDICAID

## 2022-01-26 VITALS — WEIGHT: 131.81 LBS | BODY MASS INDEX: 24.11 KG/M2 | SYSTOLIC BLOOD PRESSURE: 90 MMHG | DIASTOLIC BLOOD PRESSURE: 50 MMHG

## 2022-01-26 DIAGNOSIS — Z3A.35 35 WEEKS GESTATION OF PREGNANCY: Primary | ICD-10-CM

## 2022-01-26 DIAGNOSIS — Z3A.35 35 WEEKS GESTATION OF PREGNANCY: ICD-10-CM

## 2022-01-26 DIAGNOSIS — Z34.83 ENCOUNTER FOR SUPERVISION OF OTHER NORMAL PREGNANCY IN THIRD TRIMESTER: ICD-10-CM

## 2022-01-26 LAB
BASOPHILS # BLD AUTO: 0.05 K/UL (ref 0–0.2)
BASOPHILS NFR BLD: 0.6 % (ref 0–1.9)
DIFFERENTIAL METHOD: ABNORMAL
EOSINOPHIL # BLD AUTO: 0.1 K/UL (ref 0–0.5)
EOSINOPHIL NFR BLD: 1.8 % (ref 0–8)
ERYTHROCYTE [DISTWIDTH] IN BLOOD BY AUTOMATED COUNT: 13.1 % (ref 11.5–14.5)
HCT VFR BLD AUTO: 34.6 % (ref 37–48.5)
HGB BLD-MCNC: 12 G/DL (ref 12–16)
IMM GRANULOCYTES # BLD AUTO: 0.05 K/UL (ref 0–0.04)
IMM GRANULOCYTES NFR BLD AUTO: 0.6 % (ref 0–0.5)
LYMPHOCYTES # BLD AUTO: 1.5 K/UL (ref 1–4.8)
LYMPHOCYTES NFR BLD: 18.9 % (ref 18–48)
MCH RBC QN AUTO: 31.9 PG (ref 27–31)
MCHC RBC AUTO-ENTMCNC: 34.7 G/DL (ref 32–36)
MCV RBC AUTO: 92 FL (ref 82–98)
MONOCYTES # BLD AUTO: 0.6 K/UL (ref 0.3–1)
MONOCYTES NFR BLD: 7.6 % (ref 4–15)
NEUTROPHILS # BLD AUTO: 5.5 K/UL (ref 1.8–7.7)
NEUTROPHILS NFR BLD: 70.5 % (ref 38–73)
NRBC BLD-RTO: 0 /100 WBC
PLATELET # BLD AUTO: 143 K/UL (ref 150–450)
PMV BLD AUTO: 11.9 FL (ref 9.2–12.9)
RBC # BLD AUTO: 3.76 M/UL (ref 4–5.4)
WBC # BLD AUTO: 7.78 K/UL (ref 3.9–12.7)

## 2022-01-26 PROCEDURE — 99999 PR PBB SHADOW E&M-EST. PATIENT-LVL II: CPT | Mod: PBBFAC,,, | Performed by: OBSTETRICS & GYNECOLOGY

## 2022-01-26 PROCEDURE — 99213 PR OFFICE/OUTPT VISIT, EST, LEVL III, 20-29 MIN: ICD-10-PCS | Mod: S$PBB,TH,, | Performed by: OBSTETRICS & GYNECOLOGY

## 2022-01-26 PROCEDURE — 99212 OFFICE O/P EST SF 10 MIN: CPT | Mod: PBBFAC,TH,PO | Performed by: OBSTETRICS & GYNECOLOGY

## 2022-01-26 PROCEDURE — 87389 HIV-1 AG W/HIV-1&-2 AB AG IA: CPT | Performed by: OBSTETRICS & GYNECOLOGY

## 2022-01-26 PROCEDURE — 99213 OFFICE O/P EST LOW 20 MIN: CPT | Mod: S$PBB,TH,, | Performed by: OBSTETRICS & GYNECOLOGY

## 2022-01-26 PROCEDURE — 85025 COMPLETE CBC W/AUTO DIFF WBC: CPT | Performed by: OBSTETRICS & GYNECOLOGY

## 2022-01-26 PROCEDURE — 86592 SYPHILIS TEST NON-TREP QUAL: CPT | Performed by: OBSTETRICS & GYNECOLOGY

## 2022-01-26 PROCEDURE — 36415 COLL VENOUS BLD VENIPUNCTURE: CPT | Performed by: OBSTETRICS & GYNECOLOGY

## 2022-01-26 PROCEDURE — 99999 PR PBB SHADOW E&M-EST. PATIENT-LVL II: ICD-10-PCS | Mod: PBBFAC,,, | Performed by: OBSTETRICS & GYNECOLOGY

## 2022-01-26 PROCEDURE — 87081 CULTURE SCREEN ONLY: CPT | Performed by: OBSTETRICS & GYNECOLOGY

## 2022-01-27 ENCOUNTER — TELEPHONE (OUTPATIENT)
Dept: OBSTETRICS AND GYNECOLOGY | Facility: CLINIC | Age: 27
End: 2022-01-27
Payer: MEDICAID

## 2022-01-27 LAB — RPR SER QL: NORMAL

## 2022-01-27 NOTE — PROGRESS NOTES
No complaints today   No nausea or vomiting   Denies vaginal bleeding or cramping     BP (!) 90/50   Wt 59.8 kg (131 lb 13.4 oz)   LMP 06/14/2021 (Exact Date)   BMI 24.11 kg/m²     Prenatal labs reviewed  Aneuploidy screen  : offered  / declined ( - )    Anatomy US at 20 weeks   Consents=signed   Diabetes screen=neg   TdaP=given     GBBS @34-35W=   Growth US @ 36W  BC=       General Pregnancy  recommendations given  PNV + H2O intake    FU in 1w   Discussed nonpharmacological pain relief methods for labor, techniques and benefits of effective breastfeeding position and latch, and basic breastfeeding management. Encouraged patient to attend [facility] and to download [facility]  mobile cookie if she has not already done so. Patient verbalizes understanding.     I spent a total of 30 minutes on the day of the visit.This includes face to face time and non-face to face time preparing to see the patient (eg, review of tests), Obtaining and/or reviewing separately obtained history, Documenting clinical information in the electronic or other health record, Independently interpreting resultsand communicating results to the patient/family/caregiver, or Care coordination.          Elias Aguila M.D.   OB/GYN

## 2022-01-31 ENCOUNTER — PROCEDURE VISIT (OUTPATIENT)
Dept: OBSTETRICS AND GYNECOLOGY | Facility: CLINIC | Age: 27
End: 2022-01-31
Payer: MEDICAID

## 2022-01-31 DIAGNOSIS — Z3A.35 35 WEEKS GESTATION OF PREGNANCY: ICD-10-CM

## 2022-01-31 LAB
BACTERIA SPEC AEROBE CULT: NORMAL
HIV 1+2 AB+HIV1 P24 AG SERPL QL IA: NEGATIVE

## 2022-01-31 PROCEDURE — 76816 US OB/GYN PROCEDURE (VIEWPOINT): ICD-10-PCS | Mod: 26,S$PBB,, | Performed by: OBSTETRICS & GYNECOLOGY

## 2022-01-31 PROCEDURE — 76816 OB US FOLLOW-UP PER FETUS: CPT | Mod: PBBFAC,PO | Performed by: OBSTETRICS & GYNECOLOGY

## 2022-02-02 ENCOUNTER — ROUTINE PRENATAL (OUTPATIENT)
Dept: OBSTETRICS AND GYNECOLOGY | Facility: CLINIC | Age: 27
End: 2022-02-02
Payer: MEDICAID

## 2022-02-02 VITALS — BODY MASS INDEX: 23.87 KG/M2 | WEIGHT: 130.5 LBS

## 2022-02-02 DIAGNOSIS — Z3A.36 36 WEEKS GESTATION OF PREGNANCY: Primary | ICD-10-CM

## 2022-02-02 DIAGNOSIS — Z34.83 ENCOUNTER FOR SUPERVISION OF OTHER NORMAL PREGNANCY IN THIRD TRIMESTER: ICD-10-CM

## 2022-02-02 PROCEDURE — 99213 PR OFFICE/OUTPT VISIT, EST, LEVL III, 20-29 MIN: ICD-10-PCS | Mod: S$PBB,TH,, | Performed by: OBSTETRICS & GYNECOLOGY

## 2022-02-02 PROCEDURE — 99213 OFFICE O/P EST LOW 20 MIN: CPT | Mod: S$PBB,TH,, | Performed by: OBSTETRICS & GYNECOLOGY

## 2022-02-02 PROCEDURE — 99999 PR PBB SHADOW E&M-EST. PATIENT-LVL II: CPT | Mod: PBBFAC,,, | Performed by: OBSTETRICS & GYNECOLOGY

## 2022-02-02 PROCEDURE — 99212 OFFICE O/P EST SF 10 MIN: CPT | Mod: PBBFAC,TH,PO | Performed by: OBSTETRICS & GYNECOLOGY

## 2022-02-02 PROCEDURE — 99999 PR PBB SHADOW E&M-EST. PATIENT-LVL II: ICD-10-PCS | Mod: PBBFAC,,, | Performed by: OBSTETRICS & GYNECOLOGY

## 2022-02-02 NOTE — PROGRESS NOTES
No complaints today   No nausea or vomiting   Denies vaginal bleeding or cramping     Wt 59.2 kg (130 lb 8.2 oz)   LMP 06/14/2021 (Exact Date)   BMI 23.87 kg/m²     Prenatal labs reviewed  Aneuploidy screen  : offered  / declined ( - )    Anatomy US at 20 weeks   Consents=signed   Diabetes screen=neg   TdaP=given     GBBS @34-35W= neg  Growth US @ 36W= adequate growth   BC=       General Pregnancy  recommendations given  PNV + H2O intake    FU in 1w   Discussed nonpharmacological pain relief methods for labor, techniques and benefits of effective breastfeeding position and latch, and basic breastfeeding management. Encouraged patient to attend [facility] and to download [facility]  mobile cookie if she has not already done so. Patient verbalizes understanding.     I spent a total of 30 minutes on the day of the visit.This includes face to face time and non-face to face time preparing to see the patient (eg, review of tests), Obtaining and/or reviewing separately obtained history, Documenting clinical information in the electronic or other health record, Independently interpreting resultsand communicating results to the patient/family/caregiver, or Care coordination.          Elias Aguila M.D.   OB/GYN        No complaints today   No nausea or vomiting   Denies vaginal bleeding or cramping     Wt 59.2 kg (130 lb 8.2 oz)   LMP 06/14/2021 (Exact Date)   BMI 23.87 kg/m²     Prenatal labs reviewed  Aneuploidy screen  : offered  / declined ( - )    Anatomy US at 20 weeks   Consents=signed   Diabetes screen=neg   TdaP=given     GBBS @34-35W=   Growth US @ 36W  BC=       General Pregnancy  recommendations given  PNV + H2O intake    FU in 1w   Discussed nonpharmacological pain relief methods for labor, techniques and benefits of effective breastfeeding position and latch, and basic breastfeeding management. Encouraged patient to attend [facility] and to  download [facility] [employer]  mobile cookie if she has not already done so. Patient verbalizes understanding.     I spent a total of 30 minutes on the day of the visit.This includes face to face time and non-face to face time preparing to see the patient (eg, review of tests), Obtaining and/or reviewing separately obtained history, Documenting clinical information in the electronic or other health record, Independently interpreting resultsand communicating results to the patient/family/caregiver, or Care coordination.          Elias Aguila M.D.   OB/GYN

## 2022-02-09 ENCOUNTER — ROUTINE PRENATAL (OUTPATIENT)
Dept: OBSTETRICS AND GYNECOLOGY | Facility: CLINIC | Age: 27
End: 2022-02-09
Payer: MEDICAID

## 2022-02-09 VITALS
DIASTOLIC BLOOD PRESSURE: 64 MMHG | SYSTOLIC BLOOD PRESSURE: 104 MMHG | WEIGHT: 130.94 LBS | BODY MASS INDEX: 23.95 KG/M2

## 2022-02-09 DIAGNOSIS — Z3A.37 37 WEEKS GESTATION OF PREGNANCY: Primary | ICD-10-CM

## 2022-02-09 DIAGNOSIS — Z34.83 ENCOUNTER FOR SUPERVISION OF OTHER NORMAL PREGNANCY IN THIRD TRIMESTER: ICD-10-CM

## 2022-02-09 PROCEDURE — 99999 PR PBB SHADOW E&M-EST. PATIENT-LVL II: CPT | Mod: PBBFAC,,, | Performed by: OBSTETRICS & GYNECOLOGY

## 2022-02-09 PROCEDURE — 99213 PR OFFICE/OUTPT VISIT, EST, LEVL III, 20-29 MIN: ICD-10-PCS | Mod: S$PBB,TH,, | Performed by: OBSTETRICS & GYNECOLOGY

## 2022-02-09 PROCEDURE — 99212 OFFICE O/P EST SF 10 MIN: CPT | Mod: PBBFAC,TH,PO | Performed by: OBSTETRICS & GYNECOLOGY

## 2022-02-09 PROCEDURE — 99999 PR PBB SHADOW E&M-EST. PATIENT-LVL II: ICD-10-PCS | Mod: PBBFAC,,, | Performed by: OBSTETRICS & GYNECOLOGY

## 2022-02-09 PROCEDURE — 99213 OFFICE O/P EST LOW 20 MIN: CPT | Mod: S$PBB,TH,, | Performed by: OBSTETRICS & GYNECOLOGY

## 2022-02-09 NOTE — PROGRESS NOTES
No complaints today   No nausea or vomiting   Denies vaginal bleeding or cramping     /64   Wt 59.4 kg (130 lb 15.3 oz)   LMP 06/14/2021 (Exact Date)   BMI 23.95 kg/m²     Prenatal labs reviewed  Aneuploidy screen  : offered  / declined ( - )    Anatomy US at 20 weeks   Consents=signed   Diabetes screen=neg   TdaP=given     GBBS @34-35W= neg  Growth US @ 36W= adequate growth   BC=       General Pregnancy  recommendations given  PNV + H2O intake    FU in 1w   Discussed nonpharmacological pain relief methods for labor, techniques and benefits of effective breastfeeding position and latch, and basic breastfeeding management. Encouraged patient to attend [facility] Pren[facility]feeding [facility] and to download [facility] My[facility]  mobile cookie if she has not already done so. Patient verbalizes understanding.     I spent a total of 30 minutes on the day of the visit.This includes face to face time and non-face to face time preparing to see the patient (eg, review of tests), Obtaining and/or reviewing separately obtained history, Documenting clinical information in the electronic or other health record, Independently interpreting resultsand communicating results to the patient/family/caregiver, or Care coordination.          Elias Aguila M.D.   OB/GYN        No complaints today   No nausea or vomiting   Denies vaginal bleeding or cramping     /64   Wt 59.4 kg (130 lb 15.3 oz)   LMP 06/14/2021 (Exact Date)   BMI 23.95 kg/m²     Prenatal labs reviewed  Aneuploidy screen  : offered  / declined ( - )    Anatomy US at 20 weeks   Consents=signed   Diabetes screen=neg   TdaP=given     GBBS @34-35W=   Growth US @ 36W  BC=       General Pregnancy  recommendations given  PNV + H2O intake    FU in 1w   Discussed nonpharmacological pain relief methods for labor, techniques and benefits of effective breastfeeding position and latch, and basic breastfeeding management. Encouraged patient to attend [facility] Pren[facility]  [facility]feeding Class and to download [facility]  mobile cookie if she has not already done so. Patient verbalizes understanding.     I spent a total of 30 minutes on the day of the visit.This includes face to face time and non-face to face time preparing to see the patient (eg, review of tests), Obtaining and/or reviewing separately obtained history, Documenting clinical information in the electronic or other health record, Independently interpreting resultsand communicating results to the patient/family/caregiver, or Care coordination.          Elias Aguila M.D.   OB/GYN

## 2022-02-16 ENCOUNTER — ROUTINE PRENATAL (OUTPATIENT)
Dept: OBSTETRICS AND GYNECOLOGY | Facility: CLINIC | Age: 27
End: 2022-02-16
Payer: MEDICAID

## 2022-02-16 VITALS
SYSTOLIC BLOOD PRESSURE: 110 MMHG | BODY MASS INDEX: 24.07 KG/M2 | WEIGHT: 131.63 LBS | DIASTOLIC BLOOD PRESSURE: 70 MMHG

## 2022-02-16 DIAGNOSIS — Z34.83 ENCOUNTER FOR SUPERVISION OF OTHER NORMAL PREGNANCY IN THIRD TRIMESTER: ICD-10-CM

## 2022-02-16 DIAGNOSIS — Z30.017 ENCOUNTER FOR INITIAL PRESCRIPTION OF IMPLANTABLE SUBDERMAL CONTRACEPTIVE: Primary | ICD-10-CM

## 2022-02-16 DIAGNOSIS — Z3A.39 39 WEEKS GESTATION OF PREGNANCY: ICD-10-CM

## 2022-02-16 PROCEDURE — 99999 PR PBB SHADOW E&M-EST. PATIENT-LVL II: ICD-10-PCS | Mod: PBBFAC,,, | Performed by: OBSTETRICS & GYNECOLOGY

## 2022-02-16 PROCEDURE — 99213 OFFICE O/P EST LOW 20 MIN: CPT | Mod: S$PBB,TH,, | Performed by: OBSTETRICS & GYNECOLOGY

## 2022-02-16 PROCEDURE — 99999 PR PBB SHADOW E&M-EST. PATIENT-LVL II: CPT | Mod: PBBFAC,,, | Performed by: OBSTETRICS & GYNECOLOGY

## 2022-02-16 PROCEDURE — 99213 PR OFFICE/OUTPT VISIT, EST, LEVL III, 20-29 MIN: ICD-10-PCS | Mod: S$PBB,TH,, | Performed by: OBSTETRICS & GYNECOLOGY

## 2022-02-16 PROCEDURE — 99212 OFFICE O/P EST SF 10 MIN: CPT | Mod: PBBFAC,TH,PO | Performed by: OBSTETRICS & GYNECOLOGY

## 2022-02-16 RX ORDER — MISOPROSTOL 100 MCG
50 TABLET ORAL EVERY 4 HOURS PRN
Status: CANCELLED | OUTPATIENT
Start: 2022-02-16

## 2022-02-16 RX ORDER — SODIUM CHLORIDE 9 MG/ML
INJECTION, SOLUTION INTRAVENOUS CONTINUOUS
Status: CANCELLED | OUTPATIENT
Start: 2022-02-16

## 2022-02-16 RX ORDER — OXYTOCIN/RINGER'S LACTATE 30/500 ML
0-30 PLASTIC BAG, INJECTION (ML) INTRAVENOUS CONTINUOUS
Status: CANCELLED | OUTPATIENT
Start: 2022-02-16

## 2022-02-16 RX ORDER — CALCIUM CARBONATE 200(500)MG
500 TABLET,CHEWABLE ORAL 3 TIMES DAILY PRN
Status: CANCELLED | OUTPATIENT
Start: 2022-02-16

## 2022-02-16 RX ORDER — METHYLERGONOVINE MALEATE 0.2 MG/ML
200 INJECTION INTRAVENOUS
Status: CANCELLED | OUTPATIENT
Start: 2022-02-16

## 2022-02-16 RX ORDER — MISOPROSTOL 100 UG/1
800 TABLET ORAL
Status: CANCELLED | OUTPATIENT
Start: 2022-02-16

## 2022-02-16 RX ORDER — SODIUM CHLORIDE 9 MG/ML
INJECTION, SOLUTION INTRAVENOUS
Status: CANCELLED | OUTPATIENT
Start: 2022-02-16

## 2022-02-16 RX ORDER — SIMETHICONE 80 MG
1 TABLET,CHEWABLE ORAL 4 TIMES DAILY PRN
Status: CANCELLED | OUTPATIENT
Start: 2022-02-16

## 2022-02-16 RX ORDER — OXYTOCIN/RINGER'S LACTATE 30/500 ML
334 PLASTIC BAG, INJECTION (ML) INTRAVENOUS ONCE
Status: CANCELLED | OUTPATIENT
Start: 2022-02-16 | End: 2022-02-16

## 2022-02-16 RX ORDER — ONDANSETRON 4 MG/1
8 TABLET, ORALLY DISINTEGRATING ORAL EVERY 8 HOURS PRN
Status: CANCELLED | OUTPATIENT
Start: 2022-02-16

## 2022-02-16 RX ORDER — CARBOPROST TROMETHAMINE 250 UG/ML
250 INJECTION, SOLUTION INTRAMUSCULAR
Status: CANCELLED | OUTPATIENT
Start: 2022-02-16

## 2022-02-16 RX ORDER — OXYTOCIN/RINGER'S LACTATE 30/500 ML
95 PLASTIC BAG, INJECTION (ML) INTRAVENOUS ONCE
Status: CANCELLED | OUTPATIENT
Start: 2022-02-16 | End: 2022-02-16

## 2022-02-16 RX ORDER — MUPIROCIN 20 MG/G
OINTMENT TOPICAL
Status: CANCELLED | OUTPATIENT
Start: 2022-02-16

## 2022-02-16 RX ORDER — TERBUTALINE SULFATE 1 MG/ML
0.25 INJECTION SUBCUTANEOUS
Status: CANCELLED | OUTPATIENT
Start: 2022-02-16

## 2022-02-16 RX ORDER — PROCHLORPERAZINE EDISYLATE 5 MG/ML
5 INJECTION INTRAMUSCULAR; INTRAVENOUS EVERY 6 HOURS PRN
Status: CANCELLED | OUTPATIENT
Start: 2022-02-16

## 2022-02-16 NOTE — PROGRESS NOTES
No complaints today   No nausea or vomiting   Denies vaginal bleeding or cramping     /70   Wt 59.7 kg (131 lb 9.8 oz)   LMP 06/14/2021 (Exact Date)   BMI 24.07 kg/m²     Prenatal labs reviewed  Aneuploidy screen  : offered  / declined ( - )    Anatomy US at 20 weeks   Consents=signed   Diabetes screen=neg   TdaP=given     GBBS @34-35W= neg  Growth US @ 36W= adequate growth   BC=       General Pregnancy  recommendations given  PNV + H2O intake    IOL 2 /22/22  900pm      Discussed nonpharmacological pain relief methods for labor, techniques and benefits of effective breastfeeding position and latch, and basic breastfeeding management. Encouraged patient to attend [facility] Pren[facility]feeding Class and to download [facility] My My[facility]  mobile cookie if she has not already done so. Patient verbalizes understanding.     I spent a total of 30 minutes on the day of the visit.This includes face to face time and non-face to face time preparing to see the patient (eg, review of tests), Obtaining and/or reviewing separately obtained history, Documenting clinical information in the electronic or other health record, Independently interpreting resultsand communicating results to the patient/family/caregiver, or Care coordination.          Elias Agulia M.D.   OB/GYN        No complaints today   No nausea or vomiting   Denies vaginal bleeding or cramping     /70   Wt 59.7 kg (131 lb 9.8 oz)   LMP 06/14/2021 (Exact Date)   BMI 24.07 kg/m²     Prenatal labs reviewed  Aneuploidy screen  : offered  / declined ( - )    Anatomy US at 20 weeks   Consents=signed   Diabetes screen=neg   TdaP=given     GBBS @34-35W=   Growth US @ 36W  BC=       General Pregnancy  recommendations given  PNV + H2O intake    FU in 1w   Discussed nonpharmacological pain relief methods for labor, techniques and benefits of effective breastfeeding position and latch, and basic breastfeeding management. Encouraged patient to attend  [facility] Pren[facility] and to download [employer] [facility] [employer]  mobile cookie if she has not already done so. Patient verbalizes understanding.     I spent a total of 30 minutes on the day of the visit.This includes face to face time and non-face to face time preparing to see the patient (eg, review of tests), Obtaining and/or reviewing separately obtained history, Documenting clinical information in the electronic or other health record, Independently interpreting resultsand communicating results to the patient/family/caregiver, or Care coordination.          Elias Aguila M.D.   OB/GYN

## 2022-02-20 ENCOUNTER — HOSPITAL ENCOUNTER (INPATIENT)
Facility: HOSPITAL | Age: 27
LOS: 2 days | Discharge: HOME | End: 2022-02-22
Attending: OBSTETRICS & GYNECOLOGY | Admitting: OBSTETRICS & GYNECOLOGY
Payer: MEDICAID

## 2022-02-20 DIAGNOSIS — Z3A.39 39 WEEKS GESTATION OF PREGNANCY: ICD-10-CM

## 2022-02-20 LAB
BASOPHILS # BLD AUTO: 0.04 K/UL (ref 0–0.2)
BASOPHILS NFR BLD: 0.6 % (ref 0–1.9)
DIFFERENTIAL METHOD: ABNORMAL
EOSINOPHIL # BLD AUTO: 0.2 K/UL (ref 0–0.5)
EOSINOPHIL NFR BLD: 3.4 % (ref 0–8)
ERYTHROCYTE [DISTWIDTH] IN BLOOD BY AUTOMATED COUNT: 13.5 % (ref 11.5–14.5)
HCT VFR BLD AUTO: 35.5 % (ref 37–48.5)
HGB BLD-MCNC: 12.5 G/DL (ref 12–16)
IMM GRANULOCYTES # BLD AUTO: 0.02 K/UL (ref 0–0.04)
IMM GRANULOCYTES NFR BLD AUTO: 0.3 % (ref 0–0.5)
LYMPHOCYTES # BLD AUTO: 1.7 K/UL (ref 1–4.8)
LYMPHOCYTES NFR BLD: 26.2 % (ref 18–48)
MCH RBC QN AUTO: 32.5 PG (ref 27–31)
MCHC RBC AUTO-ENTMCNC: 35.2 G/DL (ref 32–36)
MCV RBC AUTO: 92 FL (ref 82–98)
MONOCYTES # BLD AUTO: 0.5 K/UL (ref 0.3–1)
MONOCYTES NFR BLD: 7.9 % (ref 4–15)
NEUTROPHILS # BLD AUTO: 4 K/UL (ref 1.8–7.7)
NEUTROPHILS NFR BLD: 61.6 % (ref 38–73)
NRBC BLD-RTO: 0 /100 WBC
PLATELET # BLD AUTO: 136 K/UL (ref 150–450)
PMV BLD AUTO: 12.2 FL (ref 9.2–12.9)
RBC # BLD AUTO: 3.85 M/UL (ref 4–5.4)
RUPTURE OF MEMBRANE: NEGATIVE
WBC # BLD AUTO: 6.46 K/UL (ref 3.9–12.7)

## 2022-02-20 PROCEDURE — U0002 COVID-19 LAB TEST NON-CDC: HCPCS | Performed by: OBSTETRICS & GYNECOLOGY

## 2022-02-20 PROCEDURE — 85025 COMPLETE CBC W/AUTO DIFF WBC: CPT | Performed by: OBSTETRICS & GYNECOLOGY

## 2022-02-20 PROCEDURE — 72100002 HC LABOR CARE, 1ST 8 HOURS

## 2022-02-20 PROCEDURE — 86850 RBC ANTIBODY SCREEN: CPT | Performed by: OBSTETRICS & GYNECOLOGY

## 2022-02-20 PROCEDURE — 84112 EVAL AMNIOTIC FLUID PROTEIN: CPT | Performed by: OBSTETRICS & GYNECOLOGY

## 2022-02-20 PROCEDURE — 99211 OFF/OP EST MAY X REQ PHY/QHP: CPT

## 2022-02-20 PROCEDURE — 87340 HEPATITIS B SURFACE AG IA: CPT | Performed by: OBSTETRICS & GYNECOLOGY

## 2022-02-20 PROCEDURE — 86592 SYPHILIS TEST NON-TREP QUAL: CPT | Performed by: OBSTETRICS & GYNECOLOGY

## 2022-02-20 PROCEDURE — 11000001 HC ACUTE MED/SURG PRIVATE ROOM

## 2022-02-20 PROCEDURE — 80053 COMPREHEN METABOLIC PANEL: CPT | Performed by: OBSTETRICS & GYNECOLOGY

## 2022-02-20 PROCEDURE — 36415 COLL VENOUS BLD VENIPUNCTURE: CPT | Performed by: OBSTETRICS & GYNECOLOGY

## 2022-02-20 PROCEDURE — 86900 BLOOD TYPING SEROLOGIC ABO: CPT | Performed by: OBSTETRICS & GYNECOLOGY

## 2022-02-20 PROCEDURE — 86703 HIV-1/HIV-2 1 RESULT ANTBDY: CPT | Performed by: OBSTETRICS & GYNECOLOGY

## 2022-02-20 PROCEDURE — 87389 HIV-1 AG W/HIV-1&-2 AB AG IA: CPT | Performed by: OBSTETRICS & GYNECOLOGY

## 2022-02-20 RX ORDER — OXYTOCIN/RINGER'S LACTATE 30/500 ML
334 PLASTIC BAG, INJECTION (ML) INTRAVENOUS ONCE
Status: DISCONTINUED | OUTPATIENT
Start: 2022-02-20 | End: 2022-02-22 | Stop reason: HOSPADM

## 2022-02-20 RX ORDER — TRANEXAMIC ACID 100 MG/ML
1000 INJECTION, SOLUTION INTRAVENOUS ONCE AS NEEDED
Status: DISCONTINUED | OUTPATIENT
Start: 2022-02-20 | End: 2022-02-21

## 2022-02-20 RX ORDER — SODIUM CHLORIDE, SODIUM LACTATE, POTASSIUM CHLORIDE, CALCIUM CHLORIDE 600; 310; 30; 20 MG/100ML; MG/100ML; MG/100ML; MG/100ML
INJECTION, SOLUTION INTRAVENOUS CONTINUOUS
Status: DISCONTINUED | OUTPATIENT
Start: 2022-02-21 | End: 2022-02-21

## 2022-02-20 RX ORDER — OXYTOCIN/RINGER'S LACTATE 30/500 ML
95 PLASTIC BAG, INJECTION (ML) INTRAVENOUS ONCE
Status: DISCONTINUED | OUTPATIENT
Start: 2022-02-20 | End: 2022-02-22 | Stop reason: HOSPADM

## 2022-02-20 RX ORDER — OXYTOCIN/RINGER'S LACTATE 30/500 ML
0-30 PLASTIC BAG, INJECTION (ML) INTRAVENOUS CONTINUOUS
Status: DISCONTINUED | OUTPATIENT
Start: 2022-02-20 | End: 2022-02-21

## 2022-02-20 RX ORDER — MISOPROSTOL 100 MCG
50 TABLET ORAL EVERY 4 HOURS PRN
Status: DISCONTINUED | OUTPATIENT
Start: 2022-02-20 | End: 2022-02-21

## 2022-02-20 RX ORDER — CALCIUM CARBONATE 200(500)MG
500 TABLET,CHEWABLE ORAL 3 TIMES DAILY PRN
Status: DISCONTINUED | OUTPATIENT
Start: 2022-02-20 | End: 2022-02-21

## 2022-02-20 RX ORDER — SODIUM CHLORIDE 9 MG/ML
INJECTION, SOLUTION INTRAVENOUS
Status: DISCONTINUED | OUTPATIENT
Start: 2022-02-20 | End: 2022-02-21

## 2022-02-20 RX ORDER — MUPIROCIN 20 MG/G
OINTMENT TOPICAL
Status: DISCONTINUED | OUTPATIENT
Start: 2022-02-20 | End: 2022-02-21

## 2022-02-20 RX ORDER — PROCHLORPERAZINE EDISYLATE 5 MG/ML
5 INJECTION INTRAMUSCULAR; INTRAVENOUS EVERY 6 HOURS PRN
Status: DISCONTINUED | OUTPATIENT
Start: 2022-02-20 | End: 2022-02-21

## 2022-02-20 RX ORDER — SIMETHICONE 80 MG
1 TABLET,CHEWABLE ORAL 4 TIMES DAILY PRN
Status: DISCONTINUED | OUTPATIENT
Start: 2022-02-20 | End: 2022-02-21

## 2022-02-20 RX ORDER — ONDANSETRON 8 MG/1
8 TABLET, ORALLY DISINTEGRATING ORAL EVERY 8 HOURS PRN
Status: DISCONTINUED | OUTPATIENT
Start: 2022-02-20 | End: 2022-02-21

## 2022-02-20 RX ORDER — SODIUM CHLORIDE 9 MG/ML
INJECTION, SOLUTION INTRAVENOUS CONTINUOUS
Status: DISCONTINUED | OUTPATIENT
Start: 2022-02-20 | End: 2022-02-21

## 2022-02-20 RX ORDER — TERBUTALINE SULFATE 1 MG/ML
0.25 INJECTION SUBCUTANEOUS
Status: DISCONTINUED | OUTPATIENT
Start: 2022-02-20 | End: 2022-02-21

## 2022-02-21 ENCOUNTER — ANESTHESIA (OUTPATIENT)
Dept: OBSTETRICS AND GYNECOLOGY | Facility: HOSPITAL | Age: 27
End: 2022-02-21
Payer: MEDICAID

## 2022-02-21 ENCOUNTER — ANESTHESIA EVENT (OUTPATIENT)
Dept: OBSTETRICS AND GYNECOLOGY | Facility: HOSPITAL | Age: 27
End: 2022-02-21
Payer: MEDICAID

## 2022-02-21 LAB
ABO + RH BLD: NORMAL
ALBUMIN SERPL BCP-MCNC: 3.1 G/DL (ref 3.5–5.2)
ALP SERPL-CCNC: 245 U/L (ref 55–135)
ALT SERPL W/O P-5'-P-CCNC: 10 U/L (ref 10–44)
ANION GAP SERPL CALC-SCNC: 9 MMOL/L (ref 8–16)
AST SERPL-CCNC: 14 U/L (ref 10–40)
BILIRUB SERPL-MCNC: 0.3 MG/DL (ref 0.1–1)
BLD GP AB SCN CELLS X3 SERPL QL: NORMAL
BUN SERPL-MCNC: 10 MG/DL (ref 6–20)
CALCIUM SERPL-MCNC: 8.6 MG/DL (ref 8.7–10.5)
CHLORIDE SERPL-SCNC: 108 MMOL/L (ref 95–110)
CO2 SERPL-SCNC: 19 MMOL/L (ref 23–29)
CREAT SERPL-MCNC: 0.5 MG/DL (ref 0.5–1.4)
EST. GFR  (AFRICAN AMERICAN): >60 ML/MIN/1.73 M^2
EST. GFR  (NON AFRICAN AMERICAN): >60 ML/MIN/1.73 M^2
GLUCOSE SERPL-MCNC: 80 MG/DL (ref 70–110)
HBV SURFACE AG SERPL QL IA: NEGATIVE
HIV 1+2 AB+HIV1 P24 AG SERPL QL IA: NEGATIVE
HIV1+2 IGG SERPL QL IA.RAPID: NORMAL
POTASSIUM SERPL-SCNC: 3.8 MMOL/L (ref 3.5–5.1)
PROT SERPL-MCNC: 6.6 G/DL (ref 6–8.4)
RPR SER QL: NORMAL
SARS-COV-2 RDRP RESP QL NAA+PROBE: NEGATIVE
SODIUM SERPL-SCNC: 136 MMOL/L (ref 136–145)

## 2022-02-21 PROCEDURE — 25000003 PHARM REV CODE 250: Performed by: OBSTETRICS & GYNECOLOGY

## 2022-02-21 PROCEDURE — 11000001 HC ACUTE MED/SURG PRIVATE ROOM

## 2022-02-21 PROCEDURE — 59409 PR OBSTETRICAL CARE,VAG DELIV ONLY: ICD-10-PCS | Mod: GB,,, | Performed by: OBSTETRICS & GYNECOLOGY

## 2022-02-21 PROCEDURE — 72200005 HC VAGINAL DELIVERY LEVEL II

## 2022-02-21 PROCEDURE — 27800516 HC TRAY, EPIDURAL COMBO: Performed by: ANESTHESIOLOGY

## 2022-02-21 PROCEDURE — 62326 NJX INTERLAMINAR LMBR/SAC: CPT | Performed by: ANESTHESIOLOGY

## 2022-02-21 PROCEDURE — 27200710 HC EPIDURAL INFUSION PUMP SET: Performed by: ANESTHESIOLOGY

## 2022-02-21 PROCEDURE — 63600175 PHARM REV CODE 636 W HCPCS: Performed by: OBSTETRICS & GYNECOLOGY

## 2022-02-21 PROCEDURE — 51702 INSERT TEMP BLADDER CATH: CPT

## 2022-02-21 PROCEDURE — 59409 OBSTETRICAL CARE: CPT | Mod: GB,,, | Performed by: OBSTETRICS & GYNECOLOGY

## 2022-02-21 PROCEDURE — 72100003 HC LABOR CARE, EA. ADDL. 8 HRS

## 2022-02-21 RX ORDER — METOCLOPRAMIDE HYDROCHLORIDE 5 MG/ML
10 INJECTION INTRAMUSCULAR; INTRAVENOUS ONCE
Status: DISCONTINUED | OUTPATIENT
Start: 2022-02-21 | End: 2022-02-21

## 2022-02-21 RX ORDER — ONDANSETRON 8 MG/1
8 TABLET, ORALLY DISINTEGRATING ORAL EVERY 8 HOURS PRN
Status: CANCELLED | OUTPATIENT
Start: 2022-02-21

## 2022-02-21 RX ORDER — OXYCODONE AND ACETAMINOPHEN 5; 325 MG/1; MG/1
1 TABLET ORAL EVERY 4 HOURS PRN
Status: DISCONTINUED | OUTPATIENT
Start: 2022-02-21 | End: 2022-02-22 | Stop reason: HOSPADM

## 2022-02-21 RX ORDER — NAPROXEN 500 MG/1
500 TABLET ORAL EVERY 8 HOURS PRN
Status: DISCONTINUED | OUTPATIENT
Start: 2022-02-21 | End: 2022-02-22 | Stop reason: HOSPADM

## 2022-02-21 RX ORDER — PROCHLORPERAZINE EDISYLATE 5 MG/ML
5 INJECTION INTRAMUSCULAR; INTRAVENOUS EVERY 6 HOURS PRN
Status: CANCELLED | OUTPATIENT
Start: 2022-02-21

## 2022-02-21 RX ORDER — CARBOPROST TROMETHAMINE 250 UG/ML
INJECTION, SOLUTION INTRAMUSCULAR
Status: DISCONTINUED
Start: 2022-02-21 | End: 2022-02-21 | Stop reason: WASHOUT

## 2022-02-21 RX ORDER — METHYLERGONOVINE MALEATE 0.2 MG/ML
200 INJECTION INTRAVENOUS
Status: DISCONTINUED | OUTPATIENT
Start: 2022-02-21 | End: 2022-02-21

## 2022-02-21 RX ORDER — OXYTOCIN 10 [USP'U]/ML
INJECTION, SOLUTION INTRAMUSCULAR; INTRAVENOUS
Status: DISCONTINUED
Start: 2022-02-21 | End: 2022-02-21 | Stop reason: WASHOUT

## 2022-02-21 RX ORDER — NALBUPHINE HYDROCHLORIDE 10 MG/ML
2.5 INJECTION, SOLUTION INTRAMUSCULAR; INTRAVENOUS; SUBCUTANEOUS ONCE
Status: DISCONTINUED | OUTPATIENT
Start: 2022-02-21 | End: 2022-02-21

## 2022-02-21 RX ORDER — DIPHENHYDRAMINE HYDROCHLORIDE 50 MG/ML
12.5 INJECTION INTRAMUSCULAR; INTRAVENOUS EVERY 4 HOURS PRN
Status: DISCONTINUED | OUTPATIENT
Start: 2022-02-21 | End: 2022-02-21

## 2022-02-21 RX ORDER — FAMOTIDINE 10 MG/ML
20 INJECTION INTRAVENOUS ONCE
Status: DISCONTINUED | OUTPATIENT
Start: 2022-02-21 | End: 2022-02-21

## 2022-02-21 RX ORDER — CARBOPROST TROMETHAMINE 250 UG/ML
250 INJECTION, SOLUTION INTRAMUSCULAR
Status: DISCONTINUED | OUTPATIENT
Start: 2022-02-21 | End: 2022-02-21

## 2022-02-21 RX ORDER — SODIUM CHLORIDE 9 MG/ML
INJECTION, SOLUTION INTRAVENOUS CONTINUOUS
Status: DISCONTINUED | OUTPATIENT
Start: 2022-02-21 | End: 2022-02-22 | Stop reason: HOSPADM

## 2022-02-21 RX ORDER — DIPHENHYDRAMINE HYDROCHLORIDE 50 MG/ML
25 INJECTION INTRAMUSCULAR; INTRAVENOUS EVERY 4 HOURS PRN
Status: DISCONTINUED | OUTPATIENT
Start: 2022-02-21 | End: 2022-02-22 | Stop reason: HOSPADM

## 2022-02-21 RX ORDER — SIMETHICONE 80 MG
1 TABLET,CHEWABLE ORAL EVERY 6 HOURS PRN
Status: DISCONTINUED | OUTPATIENT
Start: 2022-02-21 | End: 2022-02-22 | Stop reason: HOSPADM

## 2022-02-21 RX ORDER — HYDROCORTISONE 25 MG/G
CREAM TOPICAL 3 TIMES DAILY PRN
Status: DISCONTINUED | OUTPATIENT
Start: 2022-02-21 | End: 2022-02-22 | Stop reason: HOSPADM

## 2022-02-21 RX ORDER — FENTANYL/ROPIVACAINE/NS/PF 2MCG/ML-.2
PLASTIC BAG, INJECTION (ML) INJECTION
Status: DISPENSED
Start: 2022-02-21 | End: 2022-02-21

## 2022-02-21 RX ORDER — METHYLERGONOVINE MALEATE 0.2 MG/ML
INJECTION INTRAVENOUS
Status: DISCONTINUED
Start: 2022-02-21 | End: 2022-02-21 | Stop reason: WASHOUT

## 2022-02-21 RX ORDER — DOCUSATE SODIUM 100 MG/1
200 CAPSULE, LIQUID FILLED ORAL 2 TIMES DAILY PRN
Status: DISCONTINUED | OUTPATIENT
Start: 2022-02-21 | End: 2022-02-22 | Stop reason: HOSPADM

## 2022-02-21 RX ORDER — MISOPROSTOL 200 UG/1
TABLET ORAL
Status: DISCONTINUED
Start: 2022-02-21 | End: 2022-02-21 | Stop reason: WASHOUT

## 2022-02-21 RX ORDER — OXYTOCIN/RINGER'S LACTATE 30/500 ML
95 PLASTIC BAG, INJECTION (ML) INTRAVENOUS ONCE
Status: DISCONTINUED | OUTPATIENT
Start: 2022-02-21 | End: 2022-02-22 | Stop reason: HOSPADM

## 2022-02-21 RX ORDER — ACETAMINOPHEN 325 MG/1
650 TABLET ORAL EVERY 6 HOURS PRN
Status: DISCONTINUED | OUTPATIENT
Start: 2022-02-21 | End: 2022-02-22 | Stop reason: HOSPADM

## 2022-02-21 RX ORDER — SODIUM CITRATE AND CITRIC ACID MONOHYDRATE 334; 500 MG/5ML; MG/5ML
30 SOLUTION ORAL ONCE
Status: DISCONTINUED | OUTPATIENT
Start: 2022-02-21 | End: 2022-02-21

## 2022-02-21 RX ORDER — DIPHENHYDRAMINE HCL 25 MG
25 CAPSULE ORAL EVERY 4 HOURS PRN
Status: DISCONTINUED | OUTPATIENT
Start: 2022-02-21 | End: 2022-02-22 | Stop reason: HOSPADM

## 2022-02-21 RX ORDER — PRENATAL WITH FERROUS FUM AND FOLIC ACID 3080; 920; 120; 400; 22; 1.84; 3; 20; 10; 1; 12; 200; 27; 25; 2 [IU]/1; [IU]/1; MG/1; [IU]/1; MG/1; MG/1; MG/1; MG/1; MG/1; MG/1; UG/1; MG/1; MG/1; MG/1; MG/1
1 TABLET ORAL DAILY
Status: DISCONTINUED | OUTPATIENT
Start: 2022-02-21 | End: 2022-02-22 | Stop reason: HOSPADM

## 2022-02-21 RX ORDER — ONDANSETRON 2 MG/ML
4 INJECTION INTRAMUSCULAR; INTRAVENOUS ONCE
Status: DISCONTINUED | OUTPATIENT
Start: 2022-02-21 | End: 2022-02-21

## 2022-02-21 RX ORDER — MISOPROSTOL 200 UG/1
800 TABLET ORAL
Status: DISCONTINUED | OUTPATIENT
Start: 2022-02-21 | End: 2022-02-21

## 2022-02-21 RX ADMIN — SODIUM CHLORIDE, SODIUM LACTATE, POTASSIUM CHLORIDE, AND CALCIUM CHLORIDE: .6; .31; .03; .02 INJECTION, SOLUTION INTRAVENOUS at 12:02

## 2022-02-21 RX ADMIN — NAPROXEN 500 MG: 500 TABLET ORAL at 08:02

## 2022-02-21 RX ADMIN — OXYCODONE HYDROCHLORIDE AND ACETAMINOPHEN 1 TABLET: 5; 325 TABLET ORAL at 08:02

## 2022-02-21 RX ADMIN — Medication 2 MILLI-UNITS/MIN: at 01:02

## 2022-02-21 RX ADMIN — DEXTROSE 5 MILLION UNITS: 50 INJECTION, SOLUTION INTRAVENOUS at 06:02

## 2022-02-21 NOTE — LACTATION NOTE
[facility]  Lactation Note - Mom    SUMMARY     Maternal Assessment    Breast Size Issue: none  Nipples: Bilateral:, graspable (per pt)  Left Nipple Symptoms: other (see comments) (denies pain)  Right Nipple Symptoms: other (see comments) (denies pain)      LATCH Score         Breasts WDL    Breast WDL: WDL  Left Nipple Symptoms: other (see comments) (denies pain)  Right Nipple Symptoms: other (see comments) (denies pain)    Maternal Infant Feeding    Maternal Emotional State: independent, relaxed  Pain with Feeding: no  Latch Assistance: other (see comments) (offered, encouraged to call for latch check and assistance PRN)    Lactation Referrals    Lactation Referrals: outpatient lactation program  Outpatient Lactation Program Lactation Follow-up Date/Time: call lact ctr PRN    Lactation Interventions    Breastfeeding Assistance: support offered, feeding cue recognition promoted, feeding on demand promoted, hand expression verified (pt reports leaking since 4 months pregnant)  Breastfeeding Assistance: support offered, feeding cue recognition promoted, feeding on demand promoted, hand expression verified (pt reports leaking since 4 months pregnant)  Breastfeeding Support: encouragement provided, maternal nutrition promoted       Breastfeeding Session         Maternal Information

## 2022-02-21 NOTE — ANESTHESIA PROCEDURE NOTES
CSE    Patient location during procedure: OB  Start time: 2/21/2022 5:16 AM  Timeout: 2/21/2022 5:15 AM  End time: 2/21/2022 5:25 AM    Reason for block: at surgeon's request and post-op pain management    Staffing  Authorizing Provider: Turner Thurman MD  Performing Provider: Turner Thurman MD    Preanesthetic Checklist  Completed: patient identified, IV checked, site marked, risks and benefits discussed, surgical consent, monitors and equipment checked, pre-op evaluation and timeout performed  CSE  Patient position: sitting  Prep: ChloraPrep  Patient monitoring: heart rate, continuous pulse ox and frequent blood pressure checks  Approach: midline  Spinal Needle  Needle type: pencil-tip   Needle gauge: 25 G  Needle length: 4 in  Epidural Needle  Injection technique: KAMRAN air  Needle type: Tuohy   Needle gauge: 17 G  Needle length: 4 in  Needle insertion depth: 5 cm  Location: L3-4  Needle localization: anatomical landmarks   Catheter  Catheter type: springwSupernova  Catheter size: 20 G  Catheter at skin depth: 11 cm  Test dose: lidocaine 1.5% with Epi 1-to-200,000  Test dose: 3 mL  Additional Documentation: incremental injection, negative aspiration for CSF, negative aspiration for heme, no paresthesia on injection and negative test dose  Assessment  Sensory level: T6   Dermatomal levels determined by pinch or prick  Intrathecal Medications:   administered: primary anesthetic mcg of    Additional Notes  Sterile technique  Medications:  Medication Administration Time: 2/21/2022 5:44 AM

## 2022-02-21 NOTE — NURSING
2115-Patient arrived to unit,  [id number] used for communication. Patient states she may have had ROM at 0700.    2130-ROM plus collected, SVE 2/25/-2 2220-ROM plus negative however patient is hans regularly, will monitor to rule out labor, MD aware  2305-SVE 3/25/-2, admit orders per MD  0130-contraction pattern became irregular, patient no longer feeling contractions, pitocin started, SVE 4/25/-2.  0358-pitocin rate increased  0430-patient requested epidural, bolus from bag of LR started to prepare for epidural. Anesthesia called.  0520-patient positioned for epidural, timeout completed  0525-epidural placed  0535-carmona placed, SVE 5/30/-2  0538-pitocin rate increased  0604-deceleration noted, patient repositioned, FHT recovered   0620-pitocin rate reduced

## 2022-02-21 NOTE — PLAN OF CARE
Patient ambulating freely in room.  Lochia rubra &  Light.  Fundus firm at at umbilicus. Tolerating regular diet.  Voiding and passing flatus. Questions answered/encouraged; reassurance provided.  Pt states pain goal met without use of PRN medication.  Bonding with baby AEB holding, breastfeeding, dressing, and changing baby's diaper. Smiles appropriately at baby. Family support noted at bedside. POC reviewed with pt; able to verbalize acceptance and understanding. VS stable. Call light in reach, side rails up x2, nonskid socks on, bed in lowest position with wheels locked.  Remains free from falls and/or injury. NAD noted.  Will continue to monitor.

## 2022-02-21 NOTE — LACTATION NOTE
This note was copied from a baby's chart.  Pt's feeding plan is both breast and formula feeding. Information provided on benefits/recommendations of exclusive breastfeeding, supply and demand, adequacy of colostrum, feeding frequency and normal  feeding patterns for first days of life. Informed about possible risks of introducing formula too soon including nipple confusion, difficulty latching, and decreased milk supply. Encouraged to avoid pacifiers or bottles until breastfeeding is well established (first few weeks of life) unless medically necessary. Discussed ways to know if baby is getting enough- discussed I&Os. Pt states she has been leaking since about 4 months of pregnancy. Praise and encouragement provided. Pt verbalizes understanding.

## 2022-02-21 NOTE — NURSING
Late entry:  0810  At bedside.  No complaints.  Informed that MD would round soon.  Spouse at bedside.      1030   # [id number].  Used to assist with breastfeeding, reviewed poc for regular diet, fundal checks, fall risk, and to call RN when needs to use the restroom.  Verbal recall.     1310  Transferred to  via wheelchair with spouse and baby in crib.  ALVARO Douglass at bedside. Bedside handoff done.

## 2022-02-21 NOTE — ANESTHESIA PREPROCEDURE EVALUATION
2022  Jessie Ayers is a 26 y.o., female  term pregnancy       Patient Active Problem List   Diagnosis   (none) - all problems resolved or deleted       Past Medical History:   Diagnosis Date    Anemia        Review of patient's allergies indicates:  No Known Allergies    No past surgical history on file.    Lab Results   Component Value Date    WBC 6.46 2022    HGB 12.5 2022    HCT 35.5 (L) 2022     (L) 2022    ALT 10 2022    AST 14 2022     2022    K 3.8 2022     2022    CREATININE 0.5 2022    BUN 10 2022    CO2 19 (L) 2022    HGBA1C 4.9 2021         Pre-op Assessment    I have reviewed the Patient Summary Reports.    I have reviewed the Nursing Notes.    I have reviewed the Medications.     Review of Systems  Social:  Non-Smoker    Hematology/Oncology:     Oncology Normal    -- Anemia:   EENT/Dental:EENT/Dental Normal   Cardiovascular:  Cardiovascular Normal Exercise tolerance: good     Pulmonary:  Pulmonary Normal    Renal/:  Renal/ Normal     Hepatic/GI:  Hepatic/GI Normal    Musculoskeletal:  Musculoskeletal Normal    Neurological:  Neurology Normal    Endocrine:  Endocrine Normal    Psych:  Psychiatric Normal           Physical Exam  General:  Well nourished      Airway/Jaw/Neck:  Airway Findings: Mouth Opening: Normal   Tongue: Normal   General Airway Assessment: Adult Mallampati: II  TM Distance: Normal, at least 6 cm         Eyes/Ears/Nose:  EYES/EARS/NOSE FINDINGS: Normal   Dental:  Dental Findings: In tact     Chest/Lungs:  Chest/Lungs Findings: Normal Respiratory Rate      Heart/Vascular:  Heart Findings: Rate: Normal  Rhythm: Regular Rhythm        Mental Status:  Mental Status Findings:  Cooperative, Alert and Oriented         Anesthesia Plan  Type of Anesthesia, risks & benefits  discussed:  Anesthesia Type:  spinal, general, MAC, CSE, epidural    Patient's Preference: CSE  Plan Factors:          Intra-op Monitoring Plan: standard ASA monitors  Intra-op Monitoring Plan Comments:   Post Op Pain Control Plan: multimodal analgesia  Post Op Pain Control Plan Comments:     Induction:   IV  Beta Blocker:  Patient is not currently on a Beta-Blocker (No further documentation required).       Informed Consent: Informed consent signed with the Patient and all parties understand the risks and agree with anesthesia plan.  All questions answered.  Anesthesia consent signed with patient.  ASA Score: 2   Emergent   Day of Surgery Review of History & Physical: I have interviewed and examined the patient. I have reviewed the patient's H&P dated:              Ready For Surgery From Anesthesia Perspective.           Physical Exam  General: Well nourished    Airway:  Mallampati: II   Mouth Opening: Normal  TM Distance: Normal, at least 6 cm  Tongue: Normal    Dental:  In tact    Chest/Lungs:  Normal Respiratory Rate    Heart:  Rate: Normal  Rhythm: Regular Rhythm          Anesthesia Plan  Type of Anesthesia, risks & benefits discussed:    Anesthesia Type: spinal, general, MAC, CSE, epidural  Intra-op Monitoring Plan: standard ASA monitors  Post Op Pain Control Plan: multimodal analgesia  Induction:  IV  Informed Consent: Informed consent signed with the Patient and all parties understand the risks and agree with anesthesia plan.  All questions answered.   ASA Score: 2 Emergent  Day of Surgery Review of History & Physical: I have interviewed and examined the patient. I have reviewed the patient's H&P dated:     Ready For Surgery From Anesthesia Perspective.       .

## 2022-02-21 NOTE — DISCHARGE INSTRUCTIONS
"Instrucciones Para Peterson de Kim    Instrucciones a Seguir    Dieta regular  Actividad: Aumentarntar gradualmente  Shalonda: Regadera o Noemi  Restricciones: No levantar nada pesado  Cuidado Personal: No tampones o duchas vaginales  Actividad Sexual: Julia relaciones sexuales  Planificacion Familiar: Consulta con serrano medico  Cuidado de los Senos: Use un sosten de soporte  Regresar al trabajo/escuela: Cuando serrano medico le indique    Cuando debe llamar al Doctor    *Fiebre de 100.4 o mas alto  *Nausea/Vomito persistente  *Secrecion de la incision  *Teri sangrado vaginal o coagulos  *Inflamacion/dolor en los brazos o las piernas  *Severo dolor de reza, vision borrosa or desmayos  *Frequencia/ardor urinario  *Senales de depresion post-parto        La Depresion Post-Parto    Usted acaba de tener un melissa'.  A pesar de que sabe que deberia sentirse emocionada y wilson, lo que seinte son ganas de llorar sin motivo y tiene dificultades para realizar ro tareas diarias.  Usted pasa la mayor parte del tiempo zack, cansada y desesperanzada, y hasta podria sentirse avergonzada o culpable.  Sunil lo que le esta sucediendo no es culpa suya, y puede sentirse mejor.  Hable con serraon medico para que la ayude.     La Depresion Despues del Parto    Arsh vez sienta cansancio y ganas de llorar tereza despues de peterson a renata.  Esta etapa melancolica, denominada "baby blues", puede hacerla sentir zack y desesperanzada, o temerosa de que algo evert le vaya a pasar al melissa.  Algunas mujeres hasta llegan a poner en nabil el que puedan ser buenas madres.    Que' es la Depresion?    La depresion es un trastorno del animo que afecta serrano manera de pernsar y de sentir.  El sintoma mas frecuente es un sentimiento de honda tristeza: tambien podria causane la sensacion de que ya no puede sobrellevar la karli.    Otros sintomas incluyen:      * Ganar o perder mucho peso  * Dormir en exceso o demasiado poco  * Estar cansada todo el tiempo  * Sentirse inquieta  * " Tener miedo de querer herir al melissa'  * No tener interes por el melissa'  * Sentirse inutil o culpable   * No encontrar placer en las cosas que solia gustarle hacer  * Dificultades para pensar con claridad o alyssa decisiones  * Pensar sobre la muerte o el suicidio     Que' Causa la Depresion Postparto?    La causea exacta de la depresion postarto se desconce, aunque posiblemente es el resultado de los cambios hormonales que suceden nayeli y despues del parto.  Tambien puede deberse al cansancio que le causan las exigencias del melissa' y el proceso de adaptacion a serrano maternidad.  Todos estos factores podrian deprimirla.  En algunos casos, existe lucia predisposicion genetica a july tipo de depresion.    La depresion puede tratarse    Lo adams es que hay muchas maneras de tratar la depresion postparo.  Emprenda el primer paso para sentirse mejor hablando con serrano medico.     Recursos    * National Institutes of Mental Health-- 772-225-2417     www.nimh.nih.gov    * National Romulus on Mental Illness -- 634-209-6258     Www.seven.org    * Mental Health Jocelyn --  [phone]     Www.[facility].org    * National Suidide Hotline -- 621.958.7980    1813-8737 [employer]  Todos los derechos reservados.  Esta informacion no pretende sustituir las atencion medica profesional.  Solo serrano medico puede diagnosticar y tratar un problema de fern.          Instrucciones de la lactancia maternal para los bebes recién nacidos:   La Academia [employer] American[employer] recomienda la leche maternal rosaura la unica Nayla de nutrición para serrano bebé nayeli los primeros 6 meses de la karli, y puede continuar, con la introducción de comida, hasta y despues de 1 ano de edad. Informado sobre chapin consejos: Hay muchos beneficios de amamantar para el fern de la madre y del bebé. Julia los chupones, teteras, o botellas por lo menos por las primeras 4 semanas. Usar los chupones, teteras, o botellas pueden interumpir el proceso de amamantar.    Alimenta en cuanto hay muestras de hambre:  Cleve en la boca, hacienda movimientos de succionar.  Ruiditos suavecitos o estirarse  Llorar es un signo de hambre tardío: no esperes a que el bebé llore.  Es de esperarse que haya 8-12 alimentaciones por 24 horas, aunque estas alimentaciones no sigan un horario definido.  Alterna el seno con el que empiezas, o empieza con el seno que se siente más lleno.  Cambia de lado cuando el bebé empiece a tragar más despacio o se desconecte del seno.  Esta nickie si el bebé no come del yon seno en cada alimentación.  Si el bebé esta muy dormido o somnoliente: el contacto de piel a piel puede animarlo a empezar a comer:  Quítale la camiseta y acuéstalo sobre tu pecho desnudo  Duerme cerca de tu bebé, aun en la casa. Aprende a peterson pecho acostada.  En la posición correcta los dos estarán cómodos:  barbilla con seno, pecho con pecho  Labio del bebé abierto hacia afuera para tragar: el labio del bebé debe estar volteado hacia afuera  Boca abierta nickie bassam: la boca del bebé cubre la mayoría de la areola (el área oscura del seno)--no nada más el pezón  Fíjate en los signos de que hay transferencia de leche:  Puedes oír al bebé tragar.  No se oyen ruidos más o menos kevin rosaura clic.  El bebé no demuestra que tiene más hambre después de la alimentación.  El cuerpo del bebé y ro cleve están relajados por un tiempo corto.  Lo que entra, tiene que salir. Está pendiente de:  Al cuarto día, por lo menos 3 cacas al día.  La chanda cambia de erich a lizeth o café y luego a amarillo más líquido cuando baja tu leche.  Después del cuarto día, por lo menos 6 pa?ales mojados/pesados al día.  La orina debe ser de color amarillo lady cuando baja tu leche.  Debes alyssa agua cuando tienes sed y comer alimentos sanos cuando tiene hambre. Jessie siesta para descansar lo suficiente.   No tomes medicamentos o alcohol sino con el consejo de serrano doctor. Puedes llamar al [facility] ([phone]), Lunes a Vier[place], 8am-5pm, para obtener información sobre los medicamentos y la leche maternal.  La queenie de seguimiento con la pediatra debe ser 2 días después de salir del hospital. El bebé deberia tristan ganado el peso de nacimiento cuando tiene 10-14 días de karli.

## 2022-02-21 NOTE — H&P
UPDATED HISTORY AND PHYSICAL        2022  Subjective:       Jessie Ayers is a 26 y.o.  female with IUP at 39w0d weeks gestation who c/o contractions.   Contractions have been occuring Q1- min and have increased in intensity, achieving cervical change    This IUP is complicated by -.  Patient reports contractions, denies vaginal bleeding, reports LOF.   Fetal Movement: normal.     PMHx:   Past Medical History:   Diagnosis Date    Anemia        PSHx: History reviewed. No pertinent surgical history.    All: Review of patient's allergies indicates:  No Known Allergies    Meds:   Medications Prior to Admission   Medication Sig Dispense Refill Last Dose    prenatal vit/iron fum/folic ac (PRENATAL 1+1 ORAL) Prenatal   2022 at Unknown time    PNV,calcium 72-iron-folic acid (PRENATAL VITAMIN PLUS LOW IRON) 27 mg iron- 1 mg Tab Take 1 tablet (1 each total) by mouth once daily. 30 tablet 11     PRENATAL 28 mg iron- 800 mcg Tab TK 1 T PO QD  4 Unknown at Unknown time    prenatal vit-iron fum-folic ac 28 mg iron- 800 mcg Tab Prenatal 28 mg-800 mcg tablet   Take 1 tablet every day by oral route.   Unknown at Unknown time       SH:   Social History     Socioeconomic History    Marital status: Single   Tobacco Use    Smoking status: Never Smoker    Smokeless tobacco: Never Used   Substance and Sexual Activity    Alcohol use: Not Currently    Drug use: Never    Sexual activity: Yes     Partners: Male     Birth control/protection: None       FH:   Family History   Problem Relation Age of Onset    Diabetes Father     Hypertension Mother     Breast cancer Neg Hx     Colon cancer Neg Hx     Ovarian cancer Neg Hx        OBHx:   OB History    Para Term  AB Living   2 1 1 0 0 1   SAB IAB Ectopic Multiple Live Births   0 0 0 0 1      # Outcome Date GA Lbr [place]/2nd Weight Sex Delivery Anes PTL Lv   2 Current            1 Term 19 39w1d  3.453 kg (7 lb 9.8 oz) F Vag-Vacuum  N CANDI       Name: MANOLO PANCHAL      Apgar1: 8  Apgar5: 9       Review of Systems: Non contributory      Objective:       /74   Pulse (!) 59   Temp 97.8 °F (36.6 °C) (Oral)   Resp 16   Ht 5' (1.524 m)   Wt 59 kg (130 lb)   LMP 06/14/2021 (Exact Date)   SpO2 99%   Breastfeeding No   BMI 25.39 kg/m²     Vitals:    02/21/22 1159 02/21/22 1201 02/21/22 1229 02/21/22 1232   BP: 127/68  119/74    Pulse: (!) 49 (!) 51 (!) 53 (!) 59   Resp:       Temp:       TempSrc:       SpO2:    99%   Weight:       Height:           General:   alert, appears stated age and cooperative   Lungs:   clear to auscultation bilaterally   Heart:   regular rate and rhythm, S1, S2 normal, no murmur, click, rub or gallop   Abdomen:  soft, non-tender; bowel sounds normal; no masses,  no organomegaly   Extremities negative edema, negative erythema   FHT: 155 Cat 1 (reassuring)                 TOCO: Q 3 minutes   Presentations: cephalic by ultrasound   Cervix:     Dilation: 4    Effacement: 50%    Station:  0    Consistency: medium    Position: middle         Lab Review  Blood Type A POS  GBBS: negative         Assessment:       39w0d weeks gestation.  LAbor     Patient Active Problem List   Diagnosis   (none) - all problems resolved or deleted          Plan:      Risks, benefits, alternatives and possible complications have been discussed in detail with the patient.   - Consents signed and to chart  - Admit to Labor and Delivery unit    - Epidural per Anesthesia  - Draw CBC, T&S  - Recheck in 2 hrs or PRN            Elias Aguila M.D.   OB/GYN    2/21/2022

## 2022-02-21 NOTE — PROGRESS NOTES
DATE: 2022    S: 26 y.o.  at 39w0d   O:   BP: 119/74    FHT: 145-155 x; Mod variability , Cat 1   [place]/[facility]: q 2-3 minutes   SVE:  9/ 100  =1 station       ASSESSMENT: 39w0d   Patient Active Problem List   Diagnosis   (none) - all problems resolved or deleted       PLAN:    Continue Close Maternal/Fetal Monitoring  Pitocin Augmentation per protocol  Recheck 1h   Anticipate  Delivery soon    Elias Aguila M.D.   OB/GYN

## 2022-02-21 NOTE — L&D DELIVERY NOTE
[facility] - Labor & Delivery  Vaginal Delivery   Operative Note    DELIVERY NOTE:    After complete dilatation and +2 station  Patient pushes x 15 minutes , delivering    One viable infant sex   ( x )M / ( -) F    , in OA position   Umbilical cord was clamped and cut  , cord  blood sample was  collected   Uterine cavity manually revised for placenta.membranes and clots     Nuchal cord= NO (x)  YES (-)  Shoulder dystocia= NO   Apgar 9/9  Amniotic fluid= clear   Placenta= normal intact , complete   Umbilical Cord= 3 vessels     No cervical tears   Episiotomy =         Yes ( - )      NO  (  X)   Perineal tears =     Yes( =)    NO  ( - )  Midline first degree  Tear  Repaired CC 2-0   Vaginal tears=       Yes ( - )     NO  ( X)    EBL =  250cc  Sponge, lap, needle counts were correct   Vaginal sweep performed     No complications     Elias Aguila M.D.   OB/GYN      Patient tolerated delivery well. Sponge needle and lap counted correctly x2.    Indications: <principal problem not specified>  Pregnancy complicated by:   Patient Active Problem List   Diagnosis   (none) - all problems resolved or deleted     Admitting GA: 39w0d    Delivery Information for Ernesto Ayers    Birth information:  YOB: 2023   Time of birth: 10:04 AM   Sex: male   Head Delivery Date/Time: 2022 10:04 AM   Delivery type: Vaginal, Spontaneous   Gestational Age: 39w0d    Delivery Providers    Delivering clinician: Elias Aguila MD   Provider Role    Dominique Hayes, RN     Magan Kumar, Patient Care Assistant     Sarah Baker RN             Measurements    Weight:   Length:          Apgars    Living status: Living  Apgars:  1 min.:  5 min.:  10 min.:  15 min.:  20 min.:    Skin color:  1  1       Heart rate:  2  2       Reflex irritability:  2  2       Muscle tone:  2  2       Respiratory effort:  2        Total:  9        Apgars assigned by: JENNA BAKER RN         Operative Delivery    Forceps attempted?:  No  Vacuum extractor attempted?: No         Shoulder Dystocia    Shoulder dystocia present?: No           Presentation    Presentation: Vertex  Position: Occiput Anterior           Interventions/Resuscitation    Method: Bulb Suctioning, Tactile Stimulation       Cord    Vessels: 3 vessels  Complications: Nuchal  Nuchal Intervention: reduced  Nuchal Cord Description: loose nuchal cord  Number of Loops: 2  Delayed Cord Clamping?: Yes  Cord Blood Disposition: Sent with Baby  Gases Sent?: No  Stem Cell Collection (by MD): No       Placenta    Placenta delivery date/time: 2022 1007  Placenta removal: Spontaneous  Placenta appearance: Intact  Placenta disposition: discarded           Labor Events:       labor: No     Labor Onset Date/Time:         Dilation Complete Date/Time: 2022 10:01     Start Pushing Date/Time: 2022 10:03       Start Pushing Date/Time: 2022 10:03     Rupture Date/Time: 22  0810         Rupture type:          Fluid Amount:       Fluid Color: Clear      Fluid Odor:       Membrane Status: SRM (Spontaneous Rupture)               steroids: None     Antibiotics given for GBS: Yes     Induction: none     Indications for induction:        Augmentation: oxytocin     Indications for augmentation: Ineffective Contraction Pattern     Labor complications: None     Additional complications:          Cervical ripening:                     Delivery:      Episiotomy: None     Indication for Episiotomy:       Perineal Lacerations: 1st Repaired:  Yes   Periurethral Laceration:   Repaired:     Labial Laceration:   Repaired:     Sulcus Laceration:   Repaired:     Vaginal Laceration:   Repaired:     Cervical Laceration:   Repaired:     Repair suture:       Repair # of packets: 2     Last Value - EBL - Nursing (mL):       Sum - EBL - Nursing (mL): 0     Last Value - EBL - Anesthesia (mL):      Calculated QBL (mL):       Vaginal Sweep Performed: Yes     Surgicount Correct: Yes        Other providers:       Anesthesia    Method: Epidural, Spinal          Details (if applicable):  Trial of Labor      Categorization:      Priority:     Indications for :     Incision Type:       Additional  information:  Forceps:    Vacuum:    Breech:    Observed anomalies    Other (Comments):           Elias Aguila M.D.   OB/GYN    2022

## 2022-02-22 VITALS
HEIGHT: 60 IN | OXYGEN SATURATION: 100 % | SYSTOLIC BLOOD PRESSURE: 113 MMHG | WEIGHT: 130 LBS | BODY MASS INDEX: 25.52 KG/M2 | TEMPERATURE: 98 F | RESPIRATION RATE: 16 BRPM | DIASTOLIC BLOOD PRESSURE: 75 MMHG | HEART RATE: 73 BPM

## 2022-02-22 PROBLEM — Z3A.39 39 WEEKS GESTATION OF PREGNANCY: Status: ACTIVE | Noted: 2022-02-22

## 2022-02-22 LAB
BASOPHILS # BLD AUTO: 0.04 K/UL (ref 0–0.2)
BASOPHILS NFR BLD: 0.5 % (ref 0–1.9)
DIFFERENTIAL METHOD: ABNORMAL
EOSINOPHIL # BLD AUTO: 0.3 K/UL (ref 0–0.5)
EOSINOPHIL NFR BLD: 3.4 % (ref 0–8)
ERYTHROCYTE [DISTWIDTH] IN BLOOD BY AUTOMATED COUNT: 13.8 % (ref 11.5–14.5)
HCT VFR BLD AUTO: 34.6 % (ref 37–48.5)
HGB BLD-MCNC: 11.7 G/DL (ref 12–16)
IMM GRANULOCYTES # BLD AUTO: 0.03 K/UL (ref 0–0.04)
IMM GRANULOCYTES NFR BLD AUTO: 0.4 % (ref 0–0.5)
LYMPHOCYTES # BLD AUTO: 1.8 K/UL (ref 1–4.8)
LYMPHOCYTES NFR BLD: 22.3 % (ref 18–48)
MCH RBC QN AUTO: 31.8 PG (ref 27–31)
MCHC RBC AUTO-ENTMCNC: 33.8 G/DL (ref 32–36)
MCV RBC AUTO: 94 FL (ref 82–98)
MONOCYTES # BLD AUTO: 0.8 K/UL (ref 0.3–1)
MONOCYTES NFR BLD: 9.4 % (ref 4–15)
NEUTROPHILS # BLD AUTO: 5.1 K/UL (ref 1.8–7.7)
NEUTROPHILS NFR BLD: 64 % (ref 38–73)
NRBC BLD-RTO: 0 /100 WBC
PLATELET # BLD AUTO: 116 K/UL (ref 150–450)
PMV BLD AUTO: 12.4 FL (ref 9.2–12.9)
RBC # BLD AUTO: 3.68 M/UL (ref 4–5.4)
WBC # BLD AUTO: 7.94 K/UL (ref 3.9–12.7)

## 2022-02-22 PROCEDURE — 99238 HOSP IP/OBS DSCHRG MGMT 30/<: CPT | Mod: ,,, | Performed by: OBSTETRICS & GYNECOLOGY

## 2022-02-22 PROCEDURE — 25000003 PHARM REV CODE 250: Performed by: OBSTETRICS & GYNECOLOGY

## 2022-02-22 PROCEDURE — 99238 PR HOSPITAL DISCHARGE DAY,<30 MIN: ICD-10-PCS | Mod: ,,, | Performed by: OBSTETRICS & GYNECOLOGY

## 2022-02-22 PROCEDURE — 85025 COMPLETE CBC W/AUTO DIFF WBC: CPT | Performed by: OBSTETRICS & GYNECOLOGY

## 2022-02-22 PROCEDURE — 36415 COLL VENOUS BLD VENIPUNCTURE: CPT | Performed by: OBSTETRICS & GYNECOLOGY

## 2022-02-22 RX ORDER — NAPROXEN 500 MG/1
500 TABLET ORAL EVERY 8 HOURS PRN
Qty: 20 TABLET | Refills: 0 | Status: SHIPPED | OUTPATIENT
Start: 2022-02-22

## 2022-02-22 RX ADMIN — PRENATAL VIT W/ FE FUMARATE-FA TAB 27-0.8 MG 1 TABLET: 27-0.8 TAB at 08:02

## 2022-02-22 NOTE — PLAN OF CARE
Rounded on pt. Stated that she has been doing well with BR. Denies need for assistance at this time. Baby supine in crib at mom's bs wrapped in heavy fleece blanket. Discussed importance of safe sleep & blanket removed form crib. Baby gagging & spit small amt colostrum in crib. Had to sit baby up to pat back several times. Remained pink. In NAD. Instructed on bulb syringe use. While I was in room doing teaching with  on ipad, baby started showing fdg cues. Discussed & encouraged mom to put baby to breast. Good latch noted with minimal verbal guidance. Sucking off & on with min stimulation. Swallows noted. Mom able to hand express easily. Praise & reassurance provided. Mom will continue to exclusively breastfeed frequently & on cue at least 8+ times/24 hrs.  Will monitor for signs of deep latch & adequate fdg; I&O.  Will have baby's weight checked at ped's office in the next couple of days after d/c from hospital as recommended. Instructed to call for any questions/needs. Verbalized understanding.

## 2022-02-22 NOTE — LACTATION NOTE
[facility] - Mother & Baby  Lactation Note - Mom    SUMMARY     Maternal Assessment    Breast Size Issue: none  Breast Shape: Bilateral:, round  Breast Density: Bilateral:, soft  Areola: Bilateral:, elastic  Nipples: Bilateral:, everted  Left Nipple Symptoms: bruised, redness, tender  Right Nipple Symptoms: redness, tender      LATCH Score         Breasts WDL    Breast WDL: WDL except, nipple symptoms  Left Nipple Symptoms: bruised, redness, tender  Right Nipple Symptoms: redness, tender    Maternal Infant Feeding    Maternal Preparation: breast care, hand hygiene  Maternal Emotional State: independent, relaxed  Infant Positioning: cradle, cross-cradle  Signs of Milk Transfer: infant jaw motion present  Pain with Feeding: no (but sometimes has a pain score of 4/10 per mom;enc close positioning & deep latch)  Comfort Measures Before/During Feeding: infant position adjusted, latch adjusted  Latch Assistance: other (see comments) (verbal guidance only)    Lactation Referrals    Lactation Referrals: outpatient lactation program, pediatric care provider (has CAPPTURE info to obtain pump thru insurance)  Outpatient Lactation Program Lactation Follow-up Date/Time: call lact ctr PRN  Pediatric Care Provider Lactation Follow-up Date/Time: within 2-3 days of d/c from hospital;plans to sched appt w/ Dr Sammie Lipscomb    Lactation Interventions    Breast Care: Breastfeeding: breast milk to nipples, lanolin to nipples, milk massaged towards nipple, open to air  Breastfeeding Assistance: assisted with positioning, feeding cue recognition promoted, feeding on demand promoted, feeding session observed, hand expression verified, infant latch-on verified, infant stimulated to wakeful state, infant suck/swallow verified, support offered  Breast Care: Breastfeeding: breast milk to nipples, lanolin to nipples, milk massaged towards nipple, open to air  Breastfeeding Assistance: assisted with positioning, feeding cue  recognition promoted, feeding on demand promoted, feeding session observed, hand expression verified, infant latch-on verified, infant stimulated to wakeful state, infant suck/swallow verified, support offered  Breastfeeding Support: encouragement provided, lactation counseling provided, maternal hydration promoted, maternal nutrition promoted, maternal rest encouraged       Breastfeeding Session    Infant Positioning: cradle, cross-cradle  Signs of Milk Transfer: infant jaw motion present    Maternal Information

## 2022-02-22 NOTE — PLAN OF CARE
Note copied from Infant's chart ( MRN: [id number])    SOCIAL WORK DISCHARGE PLANNING ASSESSMENT    Sw completed discharge planning assessment with pt's mother in mother's room K305 with assistance from  Pritesh ID# [id number].  Pt's mother was easily engaged and education on the role of  was provided. Pt's mother reported all necessities for patient were obtained, including a car seat. Pt's father Jung Dhillon will provide transportation to family home following discharge. Pt's mother reported she has good family and friend support and after returning home, assistance will provided as needed. Resource information on [facility] benefits and [employer] were provided to pt's mother and no other needs for community resources were reported. SW left discharge brochure and contact information. Pt's mother was encouraged to call with any questions or concerns. Pt's mother verbalized understanding.     Legal Name: Percy Islasaza   :  2023  Address: [address] [place] [address]   Parent's Phone Numbers: [phone] ( Mother-  Jessie Ayers)   [phone] ( Father- Jung Dhillon)     Pediatrician: Dr. Sammie Lipscomb           Patient Active Problem List   Diagnosis    Term  delivered vaginally, current hospitalization         Birth Hospital:[facility]   SUJATA: 2023     Birth Weight: 3.1 kg (6 lb 13.4 oz)  Birth Length: 52 cm   Gestational Age: 39w0d          Apgars    Living status: Living  Apgars:  1 min.:  5 min.:  10 min.:  15 min.:  20 min.:    Skin color:  1  1       Heart rate:  2  2       Reflex irritability:  2  2       Muscle tone:  2  2       Respiratory effort:  2  2       Total:  9  9       Apgars assigned by: JENNA VENEGAS RN           22 1408   OB Discharge Planning Assessment   Assessment Type Discharge Planning Assessment   Source of Information family; utilized  (Jessie Ayers [phone] ( Mother)  &   Pritesh ID# [id number])   Verified Demographic and Insurance Information Yes   Insurance Medicaid   Medicaid [facility]   Medicaid Insurance Primary   Spiritual Affiliation Confucianism   Name of Support/Comfort Primary Source Jessie Ayers [phone] ( Mother)   Father's Involvement Fully Involved   Is Father signing the birth certificate Yes   Father's Address [address] [place] [address]   Family Involvement High   Primary Contact Name and Number Jessie Ayers [phone] ( Mother)   Other Contacts Names and Numbers Jung Dhillon [phone] ( Father)   Received Prenatal Care Yes   Transportation Anticipated family or friend will provide    Arrangements Family;Friends;Day Care   Infant Feeding Plan breastfeeding;formula feeding   Does baby have crib or safe sleep space? Yes   Do you have a car seat? Yes   Has other essential care items? Clothing;Bottles;Diapers   Pediatrician Dr. Sammie Lipscomb   Resource/Environmental Concerns none   Resources/Education Provided WIC  ([employer])   DCFS No indications (Indicators for Report)   Discharge Plan A Home with family

## 2022-02-22 NOTE — DISCHARGE SUMMARY
Jessie Ayers is a 26 y.o. female   PPD #1 status post  Spontaneous vaginal delivery. feels well, no complaints  Patient reports none abd pain that is adequately Relieved by Oral pain medications. Lochia is mild to moderate  and decreasing, Voiding without difficulty Ambulating with mild difficulty, has passed flatus, has had BM,  Patient does plan to breast feed.    Objective:       /71 (BP Location: Left arm, Patient Position: Sitting)   Pulse (!) 53   Temp 98.4 °F (36.9 °C) (Oral)   Resp 16   Ht 5' (1.524 m)   Wt 59 kg (130 lb)   LMP 06/14/2021 (Exact Date)   SpO2 99%   Breastfeeding Yes   BMI 25.39 kg/m²   Vitals:    02/21/22 1300 02/21/22 2008 02/22/22 0130 02/22/22 0800   BP: 107/68 112/67 122/73 109/71   BP Location: Right arm   Left arm   Patient Position: Lying   Sitting   Pulse: (!) 52 63 (!) 56 (!) 53   Resp: 16 18 18 16   Temp: 98.7 °F (37.1 °C) 98 °F (36.7 °C) 98.5 °F (36.9 °C) 98.4 °F (36.9 °C)   TempSrc: Oral Oral Oral Oral   SpO2: 100%   99%   Weight:       Height:         General:   alert, appears stated age and cooperative   Lungs:   clear to auscultation bilaterally   Heart:   regular rate and rhythm, S1, S2 normal, no murmur, click, rub or gallop   Abdomen:  soft, non-tender; bowel sounds normal; no masses,  no organomegaly   Uterus:  firm located at the umblicus.        Extremities: peripheral pulses normal, no pedal edema, no clubbing or cyanosis     Lab Review  Recent Results (from the past 72 hour(s))   Rupture of Membrane    Collection Time: 02/20/22  9:40 PM   Result Value Ref Range    Rupture of Membrane Negative Negative   COVID-19 Rapid Screening    Collection Time: 02/20/22 11:40 PM   Result Value Ref Range    SARS-CoV-2 RNA, Amplification, Qual Negative Negative   CBC with Auto Differential    Collection Time: 02/20/22 11:45 PM   Result Value Ref Range    WBC 6.46 3.90 - 12.70 K/uL    RBC 3.85 (L) 4.00 - 5.40 M/uL    Hemoglobin 12.5 12.0 - 16.0 g/dL    Hematocrit  35.5 (L) 37.0 - 48.5 %    MCV 92 82 - 98 fL    MCH 32.5 (H) 27.0 - 31.0 pg    MCHC 35.2 32.0 - 36.0 g/dL    RDW 13.5 11.5 - 14.5 %    Platelets 136 (L) 150 - 450 K/uL    MPV 12.2 9.2 - 12.9 fL    Immature Granulocytes 0.3 0.0 - 0.5 %    Gran # (ANC) 4.0 1.8 - 7.7 K/uL    Immature Grans (Abs) 0.02 0.00 - 0.04 K/uL    Lymph # 1.7 1.0 - 4.8 K/uL    Mono # 0.5 0.3 - 1.0 K/uL    Eos # 0.2 0.0 - 0.5 K/uL    Baso # 0.04 0.00 - 0.20 K/uL    nRBC 0 0 /100 WBC    Gran % 61.6 38.0 - 73.0 %    Lymph % 26.2 18.0 - 48.0 %    Mono % 7.9 4.0 - 15.0 %    Eosinophil % 3.4 0.0 - 8.0 %    Basophil % 0.6 0.0 - 1.9 %    Differential Method Automated    Comprehensive metabolic panel    Collection Time: 02/20/22 11:45 PM   Result Value Ref Range    Sodium 136 136 - 145 mmol/L    Potassium 3.8 3.5 - 5.1 mmol/L    Chloride 108 95 - 110 mmol/L    CO2 19 (L) 23 - 29 mmol/L    Glucose 80 70 - 110 mg/dL    BUN 10 6 - 20 mg/dL    Creatinine 0.5 0.5 - 1.4 mg/dL    Calcium 8.6 (L) 8.7 - 10.5 mg/dL    Total Protein 6.6 6.0 - 8.4 g/dL    Albumin 3.1 (L) 3.5 - 5.2 g/dL    Total Bilirubin 0.3 0.1 - 1.0 mg/dL    Alkaline Phosphatase 245 (H) 55 - 135 U/L    AST 14 10 - 40 U/L    ALT 10 10 - 44 U/L    Anion Gap 9 8 - 16 mmol/L    eGFR if African American >60 >60 mL/min/1.73 m^2    eGFR if non African American >60 >60 mL/min/1.73 m^2   Type & Screen, Labor & Delivery    Collection Time: 02/20/22 11:45 PM   Result Value Ref Range    Group & Rh A POS     Indirect Lisa NEG    RPR    Collection Time: 02/20/22 11:45 PM   Result Value Ref Range    RPR Non-reactive Non-reactive   RAPID HIV (SUDS) 1 and 2 Antibodies    Collection Time: 02/20/22 11:45 PM   Result Value Ref Range    HIV Rapid Testing Non-Reactive Negative   HIV 1/2 Ag/Ab (4th Gen)    Collection Time: 02/20/22 11:45 PM   Result Value Ref Range    HIV 1/2 Ag/Ab Negative Negative   Hepatitis B surface antigen    Collection Time: 02/20/22 11:46 PM   Result Value Ref Range    Hepatitis B Surface Ag  Negative Negative   CBC auto differential    Collection Time: 02/22/22  4:43 AM   Result Value Ref Range    WBC 7.94 3.90 - 12.70 K/uL    RBC 3.68 (L) 4.00 - 5.40 M/uL    Hemoglobin 11.7 (L) 12.0 - 16.0 g/dL    Hematocrit 34.6 (L) 37.0 - 48.5 %    MCV 94 82 - 98 fL    MCH 31.8 (H) 27.0 - 31.0 pg    MCHC 33.8 32.0 - 36.0 g/dL    RDW 13.8 11.5 - 14.5 %    Platelets 116 (L) 150 - 450 K/uL    MPV 12.4 9.2 - 12.9 fL    Immature Granulocytes 0.4 0.0 - 0.5 %    Gran # (ANC) 5.1 1.8 - 7.7 K/uL    Immature Grans (Abs) 0.03 0.00 - 0.04 K/uL    Lymph # 1.8 1.0 - 4.8 K/uL    Mono # 0.8 0.3 - 1.0 K/uL    Eos # 0.3 0.0 - 0.5 K/uL    Baso # 0.04 0.00 - 0.20 K/uL    nRBC 0 0 /100 WBC    Gran % 64.0 38.0 - 73.0 %    Lymph % 22.3 18.0 - 48.0 %    Mono % 9.4 4.0 - 15.0 %    Eosinophil % 3.4 0.0 - 8.0 %    Basophil % 0.5 0.0 - 1.9 %    Differential Method Automated        I/O  No intake or output data in the 24 hours ending 02/22/22 1324     Assessment:     Patient Active Problem List   Diagnosis    39 weeks gestation of pregnancy        Plan:   1. Patient doing well. Continue routine management and advances.  2. Continue PO pain meds. Pain well controlled.  3. H/h 1137/34.6.   4. Encourage ambulation.   Patient is Discharged  home today   General recommendations and alarm signs are given  Pelvic rest   Follow up with Dr Aguila  in  ( )2  -  ( x) 4   weeks   Rx sent electronically  To pharmacy on file   All questions answered     Elias Aguila M.D.   OB/GYN

## 2022-02-22 NOTE — PROGRESS NOTES
Jessie Ayers is a 26 y.o. female   PPD #1 status post  Spontaneous vaginal delivery. has no problems, feels well, no complaints  Patient reports none abd pain that is adequately Relieved by Oral pain medications. Lochia is mild to moderate  and decreasing, Voiding without difficulty Ambulating with no difficulty, has passed flatus, has not had BM,  Patient does plan to breast feed.    Objective:       /71 (BP Location: Left arm, Patient Position: Sitting)   Pulse (!) 53   Temp 98.4 °F (36.9 °C) (Oral)   Resp 16   Ht 5' (1.524 m)   Wt 59 kg (130 lb)   LMP 06/14/2021 (Exact Date)   SpO2 99%   Breastfeeding Yes   BMI 25.39 kg/m²   Vitals:    02/21/22 1300 02/21/22 2008 02/22/22 0130 02/22/22 0800   BP: 107/68 112/67 122/73 109/71   BP Location: Right arm   Left arm   Patient Position: Lying   Sitting   Pulse: (!) 52 63 (!) 56 (!) 53   Resp: 16 18 18 16   Temp: 98.7 °F (37.1 °C) 98 °F (36.7 °C) 98.5 °F (36.9 °C) 98.4 °F (36.9 °C)   TempSrc: Oral Oral Oral Oral   SpO2: 100%   99%   Weight:       Height:         General:   alert, appears stated age and cooperative   Lungs:   clear to auscultation bilaterally   Heart:   regular rate and rhythm, S1, S2 normal, no murmur, click, rub or gallop   Abdomen:  soft, non-tender; bowel sounds normal; no masses,  no organomegaly   Uterus:  firm located at the umblicus.        Extremities: peripheral pulses normal, no pedal edema, no clubbing or cyanosis     Lab Review  Recent Results (from the past 72 hour(s))   Rupture of Membrane    Collection Time: 02/20/22  9:40 PM   Result Value Ref Range    Rupture of Membrane Negative Negative   COVID-19 Rapid Screening    Collection Time: 02/20/22 11:40 PM   Result Value Ref Range    SARS-CoV-2 RNA, Amplification, Qual Negative Negative   CBC with Auto Differential    Collection Time: 02/20/22 11:45 PM   Result Value Ref Range    WBC 6.46 3.90 - 12.70 K/uL    RBC 3.85 (L) 4.00 - 5.40 M/uL    Hemoglobin 12.5 12.0 - 16.0  g/dL    Hematocrit 35.5 (L) 37.0 - 48.5 %    MCV 92 82 - 98 fL    MCH 32.5 (H) 27.0 - 31.0 pg    MCHC 35.2 32.0 - 36.0 g/dL    RDW 13.5 11.5 - 14.5 %    Platelets 136 (L) 150 - 450 K/uL    MPV 12.2 9.2 - 12.9 fL    Immature Granulocytes 0.3 0.0 - 0.5 %    Gran # (ANC) 4.0 1.8 - 7.7 K/uL    Immature Grans (Abs) 0.02 0.00 - 0.04 K/uL    Lymph # 1.7 1.0 - 4.8 K/uL    Mono # 0.5 0.3 - 1.0 K/uL    Eos # 0.2 0.0 - 0.5 K/uL    Baso # 0.04 0.00 - 0.20 K/uL    nRBC 0 0 /100 WBC    Gran % 61.6 38.0 - 73.0 %    Lymph % 26.2 18.0 - 48.0 %    Mono % 7.9 4.0 - 15.0 %    Eosinophil % 3.4 0.0 - 8.0 %    Basophil % 0.6 0.0 - 1.9 %    Differential Method Automated    Comprehensive metabolic panel    Collection Time: 02/20/22 11:45 PM   Result Value Ref Range    Sodium 136 136 - 145 mmol/L    Potassium 3.8 3.5 - 5.1 mmol/L    Chloride 108 95 - 110 mmol/L    CO2 19 (L) 23 - 29 mmol/L    Glucose 80 70 - 110 mg/dL    BUN 10 6 - 20 mg/dL    Creatinine 0.5 0.5 - 1.4 mg/dL    Calcium 8.6 (L) 8.7 - 10.5 mg/dL    Total Protein 6.6 6.0 - 8.4 g/dL    Albumin 3.1 (L) 3.5 - 5.2 g/dL    Total Bilirubin 0.3 0.1 - 1.0 mg/dL    Alkaline Phosphatase 245 (H) 55 - 135 U/L    AST 14 10 - 40 U/L    ALT 10 10 - 44 U/L    Anion Gap 9 8 - 16 mmol/L    eGFR if African American >60 >60 mL/min/1.73 m^2    eGFR if non African American >60 >60 mL/min/1.73 m^2   Type & Screen, Labor & Delivery    Collection Time: 02/20/22 11:45 PM   Result Value Ref Range    Group & Rh A POS     Indirect Lisa NEG    RPR    Collection Time: 02/20/22 11:45 PM   Result Value Ref Range    RPR Non-reactive Non-reactive   RAPID HIV (SUDS) 1 and 2 Antibodies    Collection Time: 02/20/22 11:45 PM   Result Value Ref Range    HIV Rapid Testing Non-Reactive Negative   HIV 1/2 Ag/Ab (4th Gen)    Collection Time: 02/20/22 11:45 PM   Result Value Ref Range    HIV 1/2 Ag/Ab Negative Negative   Hepatitis B surface antigen    Collection Time: 02/20/22 11:46 PM   Result Value Ref Range     Hepatitis B Surface Ag Negative Negative   CBC auto differential    Collection Time: 02/22/22  4:43 AM   Result Value Ref Range    WBC 7.94 3.90 - 12.70 K/uL    RBC 3.68 (L) 4.00 - 5.40 M/uL    Hemoglobin 11.7 (L) 12.0 - 16.0 g/dL    Hematocrit 34.6 (L) 37.0 - 48.5 %    MCV 94 82 - 98 fL    MCH 31.8 (H) 27.0 - 31.0 pg    MCHC 33.8 32.0 - 36.0 g/dL    RDW 13.8 11.5 - 14.5 %    Platelets 116 (L) 150 - 450 K/uL    MPV 12.4 9.2 - 12.9 fL    Immature Granulocytes 0.4 0.0 - 0.5 %    Gran # (ANC) 5.1 1.8 - 7.7 K/uL    Immature Grans (Abs) 0.03 0.00 - 0.04 K/uL    Lymph # 1.8 1.0 - 4.8 K/uL    Mono # 0.8 0.3 - 1.0 K/uL    Eos # 0.3 0.0 - 0.5 K/uL    Baso # 0.04 0.00 - 0.20 K/uL    nRBC 0 0 /100 WBC    Gran % 64.0 38.0 - 73.0 %    Lymph % 22.3 18.0 - 48.0 %    Mono % 9.4 4.0 - 15.0 %    Eosinophil % 3.4 0.0 - 8.0 %    Basophil % 0.5 0.0 - 1.9 %    Differential Method Automated        I/O  No intake or output data in the 24 hours ending 02/22/22 1322     Assessment:     Patient Active Problem List   Diagnosis    39 weeks gestation of pregnancy        Plan:   1. Patient doing well. Continue routine management and advances.  2. Continue PO pain meds. Pain well controlled.  3. H/h 11.7/34.7.   4. Encourage ambulation.     Anticipate discharge on the pm  If baby is discharged after labs       Elias Aguila M.D.   OB/GYN    2/22/2022

## 2022-02-22 NOTE — ANESTHESIA POSTPROCEDURE EVALUATION
Anesthesia Post Evaluation    Patient: Jessie Ayers    Procedure(s) Performed: * No procedures listed *    Final Anesthesia Type: CSE      Patient location during evaluation: labor & delivery  Patient participation: Yes- Able to Participate  Level of consciousness: awake and alert and oriented  Post-procedure vital signs: reviewed and stable  Pain management: adequate  Airway patency: patent    PONV status at discharge: No PONV  Anesthetic complications: no      Cardiovascular status: blood pressure returned to baseline  Respiratory status: unassisted  Hydration status: euvolemic  Follow-up not needed.      No catheter in back.   No headache/neckache/backache.   Full return of neurologic function.   Able to urinate.   Advised patient to report any new problems of back pain, especially with fever or decreasing bladder function occurring during coming days to weeks.       Vitals Value Taken Time   /71 02/22/22 0800   Temp 36.9 °C (98.4 °F) 02/22/22 0800   Pulse 53 02/22/22 0800   Resp 16 02/22/22 0800   SpO2 99 % 02/22/22 0800         No case tracking events are documented in the log.      Pain/Marisela Score: Pain Rating Prior to Med Admin: 4 (2/21/2022  8:18 PM)  Pain Rating Post Med Admin: 0 (2/22/2022  1:00 AM)

## 2022-02-22 NOTE — PLAN OF CARE
Pt educated on safe sleep and SIDS; written education provided in Italian to supplement. Education provided on problems that can occur while breastfeeding, ways to prevent, and Mastitis; Written education provided in Italian. Pt received discharge instructions.  Prescriptions given to pt and sent to her pharmacy for pick-up.  Questions answered/encouraged.  Pt verbalized understanding.  Car seat at bedside.  Significant other at bedside and also verbalized understanding.      1730: Pt transported off unit in wheelchair for DC.

## 2022-03-24 ENCOUNTER — PROCEDURE VISIT (OUTPATIENT)
Dept: OBSTETRICS AND GYNECOLOGY | Facility: CLINIC | Age: 27
End: 2022-03-24
Payer: MEDICAID

## 2022-03-24 ENCOUNTER — POSTPARTUM VISIT (OUTPATIENT)
Dept: OBSTETRICS AND GYNECOLOGY | Facility: CLINIC | Age: 27
End: 2022-03-24
Payer: MEDICAID

## 2022-03-24 VITALS
WEIGHT: 138.88 LBS | BODY MASS INDEX: 27.13 KG/M2 | SYSTOLIC BLOOD PRESSURE: 110 MMHG | DIASTOLIC BLOOD PRESSURE: 78 MMHG

## 2022-03-24 DIAGNOSIS — Z30.017 NEXPLANON INSERTION: Primary | ICD-10-CM

## 2022-03-24 DIAGNOSIS — Z30.017 ENCOUNTER FOR INITIAL PRESCRIPTION OF IMPLANTABLE SUBDERMAL CONTRACEPTIVE: ICD-10-CM

## 2022-03-24 PROCEDURE — 11981 INSERTION DRUG DLVR IMPLANT: CPT | Mod: PBBFAC,PO | Performed by: OBSTETRICS & GYNECOLOGY

## 2022-03-24 PROCEDURE — 11981 INSERTION DRUG DLVR IMPLANT: CPT | Mod: S$PBB,,, | Performed by: OBSTETRICS & GYNECOLOGY

## 2022-03-24 PROCEDURE — 99499 UNLISTED E&M SERVICE: CPT | Mod: S$PBB,,, | Performed by: OBSTETRICS & GYNECOLOGY

## 2022-03-24 PROCEDURE — 59430 PR CARE AFTER DELIVERY ONLY: ICD-10-PCS | Mod: S$PBB,,, | Performed by: OBSTETRICS & GYNECOLOGY

## 2022-03-24 PROCEDURE — 99999 PR PBB SHADOW E&M-EST. PATIENT-LVL I: ICD-10-PCS | Mod: PBBFAC,,, | Performed by: OBSTETRICS & GYNECOLOGY

## 2022-03-24 PROCEDURE — 99211 OFF/OP EST MAY X REQ PHY/QHP: CPT | Mod: PBBFAC,PO | Performed by: OBSTETRICS & GYNECOLOGY

## 2022-03-24 PROCEDURE — 99999 PR PBB SHADOW E&M-EST. PATIENT-LVL I: CPT | Mod: PBBFAC,,, | Performed by: OBSTETRICS & GYNECOLOGY

## 2022-03-24 PROCEDURE — 11981 PR INSERT, DRUG DELIVERY IMPLANT, BIORESORB/BIODEGR/NON-BIODEGR: ICD-10-PCS | Mod: S$PBB,,, | Performed by: OBSTETRICS & GYNECOLOGY

## 2022-03-24 PROCEDURE — 99499 NO LOS: ICD-10-PCS | Mod: S$PBB,,, | Performed by: OBSTETRICS & GYNECOLOGY

## 2022-03-24 RX ADMIN — ETONOGESTREL 68 MG: 68 IMPLANT SUBCUTANEOUS at 02:03

## 2022-03-24 NOTE — PROGRESS NOTES
CC: Post-partum follow-up    Jessie Ayers is a 26 y.o. female  who presents for post-partum visit.  She is S/P a .  She and the baby are doing well.  No pain.  No fever.   No bowel / bladder complaints.    Delivery Date: 2022  Delivery MD: Mingo  Breast Feeding: YES  Depression: NO      LMP 2021 (Exact Date)   Breastfeeding Yes     ROS:  GENERAL: No fever, chills, fatigability.  VULVAR: No pain, no lesions and no itching.  VAGINAL: No relaxation, no itching, no discharge, no abnormal bleeding and no lesions.  ABDOMEN: No abdominal pain. Denies nausea. Denies vomiting. No diarrhea. No constipation  BREAST: Denies pain. No lumps. No discharge.  URINARY: No incontinence, no nocturia, no frequency and no dysuria.  CARDIOVASCULAR: No chest pain. No shortness of breath. No leg cramps.  NEUROLOGICAL: No headaches. No vision changes.    PHYSICAL EXAM:  ABDOMEN:  Soft, non-tender, non-distended  VULVA:  Normal, no lesions  CERVIX:  Without lesions, polyps or tenderness.  UTERUS:  Normal size, shape, consistency, no mass or tenderness.  ADNEXA:  Normal in size without mass or tenderness    IMP:  Doing well S/P   Instructions / precautions reviewed  Contraceptive counseling      PLAN:  May resume normal activities  Return: for annual visit or PRN       Elias Aguila M.D.   OB/GYN

## 2022-03-24 NOTE — PROCEDURES
NEXPLANON  INSERTION:      PRE- INSERTION COUNSELING:  All contraceptive options were reviewed and the patient chooses NEXPLANON  Patients history was reviewed and there were no contraindications to Implanon.  The procedure and minimal risks of pain, bleeding, bruising and infection at the insertion site discussed. Possible irregular menstrual bleeding pattern versus amenorrhea was explained.  No protection against STDs discussed.  Written information provided; all questions answered and patient agrees to proceed.  Consent signed and scanned into computer.    EXAM:  With patient in supine position the nondominant arm was flexed at the elbow and externally rotated.  The insertion site was identified 6-8 cm above the elbow crease at the inner side of the upper arm overlying the groove between biceps and triceps.  The insertion site was marked and a second lizandro was placed 6-8 cm above the first.    PROCEDURE:  TIME OUT PERFORMED.  The insertion site was prepped with antiseptic and injected with 2 cc of 1% Xylocaine without epinephrine subq along the planned insertion canal.  Xylocaine subq along the planned insertion canal.  Using sterile technique the Implanon applicator was visually verified and removed from the blister pack.  The base of the applicator was gently tapped with the needle pointed up until the implant disappeared back into the needle and the needle cap was removed.  The needle tip was inserted bevel side up at a 20 degree angle to penetrate the skin.  The applicator was lowered parallel to the arm and the skin was tented with the needle.  The seal of the applicator was broken by pressing the obturator support and turned 90degrees.  The obturator tip was fixed in place on the arm with one hand and with the other hand the needle was slowly and fully retracted back along full length of the obturator.  The grooved tip of the obturator was visible inside the needle and the implant waspalpable after  insertion.  A small adhesive bandage and then a pressure bandage was placed over the insertion site.  The patient tolerated the procedure well.    ASSESSMENT:  1. Contraception management / Implanon insertion.V25.0.    POST IMPLANON INSERTION COUNSELING:  Manage post Implanon placement arm pain with NSAIDs, Tylenol or Rx per [employer].  Keep arm elevated and apply intermittent ice packs to decrease pain and bruising for 24 Hours.  May remove bandage in 24 hours.  Implanon danger signs (worsening pain at insertion site, bleeding through bandage, redness and/or pus drainage at insertion site).  Removal in 3 years.    Counseling lasted approximately 15 minutes and all her questions were answered.    FOLLOW-UP: with me in two weeks.    Elias Aguila M.D.   OB/GYN

## 2022-06-17 ENCOUNTER — PATIENT MESSAGE (OUTPATIENT)
Dept: ADMINISTRATIVE | Facility: HOSPITAL | Age: 27
End: 2022-06-17
Payer: MEDICAID

## 2023-01-30 PROBLEM — Z3A.39 39 WEEKS GESTATION OF PREGNANCY: Status: RESOLVED | Noted: 2022-02-22 | Resolved: 2023-01-30
